# Patient Record
Sex: FEMALE | NOT HISPANIC OR LATINO | Employment: FULL TIME | URBAN - METROPOLITAN AREA
[De-identification: names, ages, dates, MRNs, and addresses within clinical notes are randomized per-mention and may not be internally consistent; named-entity substitution may affect disease eponyms.]

---

## 2018-01-17 ENCOUNTER — OFFICE VISIT (OUTPATIENT)
Dept: URGENT CARE | Facility: CLINIC | Age: 23
End: 2018-01-17
Payer: COMMERCIAL

## 2018-01-17 VITALS
HEART RATE: 88 BPM | OXYGEN SATURATION: 96 % | WEIGHT: 105.2 LBS | DIASTOLIC BLOOD PRESSURE: 70 MMHG | HEIGHT: 64 IN | RESPIRATION RATE: 12 BRPM | TEMPERATURE: 98.4 F | BODY MASS INDEX: 17.96 KG/M2 | SYSTOLIC BLOOD PRESSURE: 98 MMHG

## 2018-01-17 DIAGNOSIS — R05.9 COUGH: ICD-10-CM

## 2018-01-17 DIAGNOSIS — R68.89 FLU-LIKE SYMPTOMS: ICD-10-CM

## 2018-01-17 PROCEDURE — 99203 OFFICE O/P NEW LOW 30 MIN: CPT | Performed by: PHYSICIAN ASSISTANT

## 2018-01-17 RX ORDER — OSELTAMIVIR PHOSPHATE 75 MG/1
75 CAPSULE ORAL 2 TIMES DAILY
Qty: 10 CAP | Refills: 0 | Status: SHIPPED | OUTPATIENT
Start: 2018-01-17 | End: 2018-01-22

## 2018-01-17 RX ORDER — AZITHROMYCIN 250 MG/1
TABLET, FILM COATED ORAL
Qty: 6 TAB | Refills: 1 | Status: SHIPPED | OUTPATIENT
Start: 2018-01-17 | End: 2021-09-28

## 2018-01-17 ASSESSMENT — ENCOUNTER SYMPTOMS
BODY ACHES: 1
CARDIOVASCULAR NEGATIVE: 1
HEADACHES: 1
EYES NEGATIVE: 1
MYALGIAS: 1
FEVER: 0
COUGH: 0
NUMBER OF EPISODES OF EMESIS TODAY: 1
CHILLS: 1
RESPIRATORY NEGATIVE: 1
GASTROINTESTINAL NEGATIVE: 1
SORE THROAT: 0

## 2018-01-17 NOTE — PROGRESS NOTES
"Subjective:      Martha Joseph is a 22 y.o. female who presents with Headache (x5days); Emesis; Generalized Body Aches; and Chills            Emesis   Associated symptoms include chills, headaches and myalgias. Pertinent negatives include no congestion, coughing, fever or sore throat.   Generalized Body Aches   This is a new problem. The current episode started yesterday (aches, naus, ha). The problem occurs constantly. The problem has been unchanged. Associated symptoms include chills, headaches and myalgias. Pertinent negatives include no congestion, coughing, fever or sore throat. Nothing aggravates the symptoms. She has tried nothing for the symptoms. The treatment provided no relief.   Chills   Associated symptoms include chills, headaches and myalgias. Pertinent negatives include no congestion, coughing, fever or sore throat.       Review of Systems   Constitutional: Positive for chills. Negative for fever.   HENT: Negative.  Negative for congestion and sore throat.    Eyes: Negative.    Respiratory: Negative.  Negative for cough.    Cardiovascular: Negative.    Gastrointestinal: Negative.    Musculoskeletal: Positive for myalgias.   Skin: Negative.    Neurological: Positive for headaches.          Objective:     BP (!) 98/70   Pulse 88   Temp 36.9 °C (98.4 °F)   Resp 12   Ht 1.626 m (5' 4\")   Wt 47.7 kg (105 lb 3.2 oz)   SpO2 96%   BMI 18.06 kg/m²      Physical Exam   Constitutional: She is oriented to person, place, and time. She appears well-developed and well-nourished. No distress.   HENT:   Head: Normocephalic and atraumatic.   Eyes: EOM are normal. Pupils are equal, round, and reactive to light.   Neck: Normal range of motion. Neck supple.   Cardiovascular: Normal rate.    Pulmonary/Chest: Effort normal and breath sounds normal. No respiratory distress.   Neurological: She is alert and oriented to person, place, and time.   Skin: Skin is warm and dry.   Psychiatric: She has a normal mood and " "affect. Her behavior is normal.   Nursing note and vitals reviewed.    Vitals:    01/17/18 1219   BP: (!) 98/70   Pulse: 88   Resp: 12   Temp: 36.9 °C (98.4 °F)   SpO2: 96%   Weight: 47.7 kg (105 lb 3.2 oz)   Height: 1.626 m (5' 4\")     Active Ambulatory Problems     Diagnosis Date Noted   • Acute transverse myelitis (CMS-HCC) 11/16/2009     Resolved Ambulatory Problems     Diagnosis Date Noted   • No Resolved Ambulatory Problems     Past Medical History:   Diagnosis Date   • Transverse myelitis (CMS-HCC)      Current Outpatient Prescriptions on File Prior to Visit   Medication Sig Dispense Refill   • albuterol (VENTOLIN OR PROVENTIL) 108 (90 BASE) MCG/ACT AERS inhalation aerosol Inhale 2 Puffs by mouth every four hours as needed for Shortness of Breath. With a spacer device 1 Inhaler 0     No current facility-administered medications on file prior to visit.      Social History     Social History   • Marital status: Single     Spouse name: N/A   • Number of children: N/A   • Years of education: N/A     Occupational History   • Not on file.     Social History Main Topics   • Smoking status: Current Every Day Smoker   • Smokeless tobacco: Never Used   • Alcohol use No   • Drug use: No   • Sexual activity: No     Other Topics Concern   • Not on file     Social History Narrative   • No narrative on file     History reviewed. No pertinent family history.  Patient has no known allergies.              Assessment/Plan:     ·  flu sx      · rx meds; otc prn      "

## 2018-01-17 NOTE — LETTER
January 17, 2018         Patient: Martha Joseph   YOB: 1995   Date of Visit: 1/17/2018           To Whom it May Concern:    Martha Joseph was seen in my clinic on 1/17/2018 for illness; please excuse Tuesday thru Thursday.    If you have any questions or concerns, please don't hesitate to call.        Sincerely,           Cole Foley P.A.-C.  Electronically Signed

## 2018-01-19 ENCOUNTER — OFFICE VISIT (OUTPATIENT)
Dept: URGENT CARE | Facility: CLINIC | Age: 23
End: 2018-01-19
Payer: COMMERCIAL

## 2018-01-19 VITALS
BODY MASS INDEX: 17.93 KG/M2 | WEIGHT: 105 LBS | HEART RATE: 106 BPM | HEIGHT: 64 IN | TEMPERATURE: 98.2 F | OXYGEN SATURATION: 95 % | DIASTOLIC BLOOD PRESSURE: 74 MMHG | SYSTOLIC BLOOD PRESSURE: 100 MMHG

## 2018-01-19 DIAGNOSIS — J06.9 VIRAL URI WITH COUGH: ICD-10-CM

## 2018-01-19 PROCEDURE — 99212 OFFICE O/P EST SF 10 MIN: CPT | Performed by: NURSE PRACTITIONER

## 2018-01-19 ASSESSMENT — ENCOUNTER SYMPTOMS
COUGH: 1
FEVER: 1

## 2018-01-19 NOTE — LETTER
January 19, 2018         Patient: Martha Joseph   YOB: 1995   Date of Visit: 1/19/2018           To Whom it May Concern:    Martha Joseph was seen in my clinic on 1/19/2018. Please excuse her from work today.         Sincerely,           OLI Taylor.  Electronically Signed

## 2018-01-19 NOTE — PROGRESS NOTES
"Subjective:      Martha Joseph is a 22 y.o. female who presents with Cough (\"chest congestion,needs extended note for work\")            This is a new problem. Pt reports she was here a few days ago with a cold and given several Rx. She is taking them and feeling better but still had a fever this morning and could not go back to work today. She needs a note excusing her from work today.        Review of Systems   Constitutional: Positive for fever.   Respiratory: Positive for cough.    All other systems reviewed and are negative.    Past Medical History:   Diagnosis Date   • Transverse myelitis (CMS-HCC)     No past surgical history on file.   Social History     Social History   • Marital status: Single     Spouse name: N/A   • Number of children: N/A   • Years of education: N/A     Occupational History   • Not on file.     Social History Main Topics   • Smoking status: Current Every Day Smoker   • Smokeless tobacco: Never Used   • Alcohol use No   • Drug use: No   • Sexual activity: No     Other Topics Concern   • Not on file     Social History Narrative   • No narrative on file          Objective:     /74   Pulse (!) 106   Temp 36.8 °C (98.2 °F)   Ht 1.626 m (5' 4\")   Wt 47.6 kg (105 lb)   SpO2 95%   BMI 18.02 kg/m²      Physical Exam   Constitutional: She is oriented to person, place, and time. Vital signs are normal. She appears well-developed and well-nourished.   HENT:   Head: Normocephalic and atraumatic.   Nose: Nose normal.   Eyes: EOM are normal. Pupils are equal, round, and reactive to light.   Neck: Normal range of motion.   Cardiovascular: Normal rate and regular rhythm.    Pulmonary/Chest: Effort normal and breath sounds normal.   Musculoskeletal: Normal range of motion.   Neurological: She is alert and oriented to person, place, and time.   Skin: Skin is warm and dry. Capillary refill takes less than 2 seconds.   Psychiatric: She has a normal mood and affect. Her speech is normal and " behavior is normal. Thought content normal.   Vitals reviewed.              Assessment/Plan:     1. Viral URI with cough    Pt denies any other needs at this time  Work note provided  Instructed to return to  or nearest emergency department if symptoms fail to improve, for any change in condition, further concerns, or new concerning symptoms.  Patient states understanding of the plan of care and discharge instructions.

## 2018-07-09 ENCOUNTER — OFFICE VISIT (OUTPATIENT)
Dept: URGENT CARE | Facility: CLINIC | Age: 23
End: 2018-07-09
Payer: COMMERCIAL

## 2018-07-09 VITALS
DIASTOLIC BLOOD PRESSURE: 70 MMHG | TEMPERATURE: 99.2 F | SYSTOLIC BLOOD PRESSURE: 118 MMHG | BODY MASS INDEX: 18.51 KG/M2 | WEIGHT: 108.4 LBS | RESPIRATION RATE: 18 BRPM | OXYGEN SATURATION: 98 % | HEIGHT: 64 IN | HEART RATE: 97 BPM

## 2018-07-09 DIAGNOSIS — H81.13 BENIGN PAROXYSMAL POSITIONAL VERTIGO DUE TO BILATERAL VESTIBULAR DISORDER: ICD-10-CM

## 2018-07-09 PROCEDURE — 99214 OFFICE O/P EST MOD 30 MIN: CPT | Performed by: FAMILY MEDICINE

## 2018-07-09 RX ORDER — MECLIZINE HYDROCHLORIDE 25 MG/1
25 TABLET ORAL 3 TIMES DAILY PRN
Qty: 30 TAB | Refills: 0 | Status: SHIPPED | OUTPATIENT
Start: 2018-07-09 | End: 2021-09-28

## 2018-07-09 NOTE — PROGRESS NOTES
"  Chief Complaint   Patient presents with   • Dizziness     x3days, dizziness, ear pain, fatigue         Patient complains of dizziness for 3 days.   Symptoms are unchanged since onset.   Symptoms are constant.  Denies almost passing out.   States she feels room is spinning around, feels unsteady on feet.   Worse with bending head to look up, getting up from supine position.    No double vision, hearing loss, numbness, nausea, vomiting, headache, slurred speech or shortness of breath.    Feels unsteady when walking.  Denies depression, anxiety.   + rt ear pain         Social History   Substance Use Topics   • Smoking status: Current Every Day Smoker   • Smokeless tobacco: Never Used   • Alcohol use No         Past Medical History:   Diagnosis Date   • Transverse myelitis (HCC)            Review of Systems:  Review of Systems   Constitutional: Negative for fever, chills.   HENT: no neck pain, headache  Eyes: denies vision changes or discharge  Respiratory: no cough, congestion, SOB  Cardiovascular: denies palpations, chest pain   Gastrointestinal: denies diarrhea, abdominal pain or constipation.  No blood in stool.  Musculoskeletal: denies back pain or joint pain    Skin: no itching or rash  Neurological: No numbness or tingling.   Psych - denies depression, anxiety   - Denies dysuria, frequency or discharge  10 point ROS otherwise negative, except per HPI      Blood pressure 118/70, pulse 97, temperature 37.3 °C (99.2 °F), resp. rate 18, height 1.626 m (5' 4\"), weight 49.2 kg (108 lb 6.4 oz), SpO2 98 %.      EXAM   HEENT - PERRLA, EOMI  TMs both normal.   No nasal congestion  Neuro - alert and oriented x3. CN 2-12 grossly intact.  Lungs - CTA. No wheezes, rhonchi or rales.  Heart - regular rate and rhythm without murmur.  Abdomen - soft and non-tender, bowel sounds active x4.  Musculoskeletal - No lower extremity edema noted.  Jennifer's sign negative bilaterally      assessment & plan    1. BPPV (benign paroxysmal " positional vertigo), unspecified laterality     - meclizine (ANTIVERT) 25 MG Tab; Take 1 Tab by mouth 3 times a day as needed.  Dispense: 30 Tab; Refill: 0    Follow up in one week if no improvement, sooner if symptoms worsen.

## 2018-07-27 ENCOUNTER — OFFICE VISIT (OUTPATIENT)
Dept: URGENT CARE | Facility: PHYSICIAN GROUP | Age: 23
End: 2018-07-27
Payer: COMMERCIAL

## 2018-07-27 VITALS
DIASTOLIC BLOOD PRESSURE: 68 MMHG | WEIGHT: 108 LBS | OXYGEN SATURATION: 96 % | HEART RATE: 71 BPM | HEIGHT: 64 IN | SYSTOLIC BLOOD PRESSURE: 124 MMHG | TEMPERATURE: 98.6 F | BODY MASS INDEX: 18.44 KG/M2

## 2018-07-27 DIAGNOSIS — H69.93 DYSFUNCTION OF BOTH EUSTACHIAN TUBES: ICD-10-CM

## 2018-07-27 PROCEDURE — 99214 OFFICE O/P EST MOD 30 MIN: CPT | Performed by: FAMILY MEDICINE

## 2018-07-27 RX ORDER — FLUTICASONE PROPIONATE 50 MCG
1 SPRAY, SUSPENSION (ML) NASAL 2 TIMES DAILY
Qty: 1 BOTTLE | Refills: 0 | Status: SHIPPED | OUTPATIENT
Start: 2018-07-27 | End: 2021-09-28

## 2018-07-27 RX ORDER — FEXOFENADINE HCL AND PSEUDOEPHEDRINE HCI 60; 120 MG/1; MG/1
1 TABLET, EXTENDED RELEASE ORAL 2 TIMES DAILY
Qty: 30 TAB | Refills: 0 | Status: SHIPPED | OUTPATIENT
Start: 2018-07-27 | End: 2021-09-28

## 2018-07-27 NOTE — PROGRESS NOTES
Subjective:      CC: Otalgia             bilateral ear congestion  This is a new problem. The current episode started 1 day ago. The problem occurs intermittently. The problem has been unchanged. Associated symptoms include : intermittent dizziness. Pertinent negatives include no abdominal pain, chest pain, chills, fever, headaches, joint swelling, myalgias, nausea, neck pain, rash or visual change. Nothing aggravates the symptoms. Pt has tried nothing for the symptoms.     Social History   Substance Use Topics   • Smoking status: Current Every Day Smoker   • Smokeless tobacco: Never Used   • Alcohol use No         Past Medical History:   Diagnosis Date   • Transverse myelitis (HCC)        Current Outpatient Prescriptions on File Prior to Visit   Medication Sig Dispense Refill   • meclizine (ANTIVERT) 25 MG Tab Take 1 Tab by mouth 3 times a day as needed. (Patient not taking: Reported on 7/27/2018) 30 Tab 0   • IBUPROFEN PO Take  by mouth.     • azithromycin (ZITHROMAX) 250 MG Tab z-lyle; U.D. (Patient not taking: Reported on 7/27/2018) 6 Tab 1   • albuterol (VENTOLIN OR PROVENTIL) 108 (90 BASE) MCG/ACT AERS inhalation aerosol Inhale 2 Puffs by mouth every four hours as needed for Shortness of Breath. With a spacer device (Patient not taking: Reported on 7/27/2018) 1 Inhaler 0     No current facility-administered medications on file prior to visit.          Review of Systems   Constitutional: Negative for fever and chills.   HENT:  . Negative for hearing loss and tinnitus.    Respiratory: no cough.  Negative for hemoptysis, shortness of breath and wheezing.    Cardiovascular: Negative for chest pain, palpitations and leg swelling.   Gastrointestinal: Negative for nausea and abdominal pain.   Musculoskeletal: Negative for myalgias, joint swelling and neck pain.   Skin: Negative for rash.   Neurological: Negative for headaches.   All other systems reviewed and are negative.         Objective:     Blood pressure 124/68,  "pulse 71, temperature 37 °C (98.6 °F), height 1.626 m (5' 4\"), weight 49 kg (108 lb), SpO2 96 %.    Physical Exam   Constitutional: Vital signs are normal. She is active. No distress.   HENT:   Head: There is normal jaw occlusion.   Right Ear: External ear normal. Tympanic membrane is normal. No middle ear effusion.   Left Ear: External ear normal. Tympanic membrane is normal. No middle ear effusion.    Nose: no edema.  No nasal discharge.   Mouth/Throat: Mucous membranes are moist. No oral lesions.  No erythema. No oropharyngeal exudate, pharynx swelling or pharynx petechiae. Tonsils are 0 on the right. Tonsils are 0 on the left. No tonsillar exudate.   Eyes: Conjunctivae and EOM are normal. Pupils are equal, round, and reactive to light. Right eye exhibits no discharge. Left eye exhibits no discharge.   Neck: Normal range of motion. Neck supple.  No adenopathy  Cardiovascular: Normal rate and regular rhythm.  Pulses are palpable.    No murmur heard.  Pulmonary/Chest: Effort normal and breath sounds normal. There is normal air entry. No respiratory distress. no wheezes, rhonchi,  retraction.   Musculoskeletal:   no edema.   Neurological: A/O x 3.   CN 2-12 intact   Skin: Skin is warm. Capillary refill takes less than 3 seconds. No purpura and no rash noted. Patient is not diaphoretic. No jaundice or pallor.   Nursing note and vitals reviewed.              Assessment/Plan:     1. Dysfunction of both eustachian tubes     - fexofenadine-pseudoephedrine (ALLEGRA-D)  MG per tablet; Take 1 Tab by mouth 2 times a day.  Dispense: 30 Tab; Refill: 0  - fluticasone (FLONASE) 50 MCG/ACT nasal spray; Spray 1 Spray in nose 2 times a day.  Dispense: 1 Bottle; Refill: 0        Follow up in one week if no improvement    "

## 2018-08-14 ENCOUNTER — OFFICE VISIT (OUTPATIENT)
Dept: URGENT CARE | Facility: CLINIC | Age: 23
End: 2018-08-14
Payer: COMMERCIAL

## 2018-08-14 VITALS
HEART RATE: 83 BPM | SYSTOLIC BLOOD PRESSURE: 90 MMHG | OXYGEN SATURATION: 95 % | BODY MASS INDEX: 18.85 KG/M2 | TEMPERATURE: 98.7 F | RESPIRATION RATE: 14 BRPM | DIASTOLIC BLOOD PRESSURE: 60 MMHG | WEIGHT: 110.4 LBS | HEIGHT: 64 IN

## 2018-08-14 DIAGNOSIS — J02.9 SORE THROAT: ICD-10-CM

## 2018-08-14 DIAGNOSIS — J20.9 ACUTE BRONCHITIS, UNSPECIFIED ORGANISM: ICD-10-CM

## 2018-08-14 LAB
INT CON NEG: NORMAL
INT CON POS: NORMAL
S PYO AG THROAT QL: NEGATIVE

## 2018-08-14 PROCEDURE — 87880 STREP A ASSAY W/OPTIC: CPT | Performed by: NURSE PRACTITIONER

## 2018-08-14 PROCEDURE — 99214 OFFICE O/P EST MOD 30 MIN: CPT | Performed by: NURSE PRACTITIONER

## 2018-08-14 RX ORDER — AZITHROMYCIN 250 MG/1
TABLET, FILM COATED ORAL
Qty: 6 TAB | Refills: 0 | Status: SHIPPED | OUTPATIENT
Start: 2018-08-14 | End: 2021-09-28

## 2018-08-14 RX ORDER — METHYLPREDNISOLONE 4 MG/1
TABLET ORAL
Qty: 1 KIT | Refills: 0 | Status: SHIPPED | OUTPATIENT
Start: 2018-08-14 | End: 2019-03-18

## 2018-08-14 ASSESSMENT — ENCOUNTER SYMPTOMS
NAUSEA: 0
CHILLS: 0
MYALGIAS: 0
SHORTNESS OF BREATH: 0
SORE THROAT: 1
WHEEZING: 1
COUGH: 1
DIZZINESS: 0
EYE DISCHARGE: 0
HEADACHES: 0
DIARRHEA: 0
FEVER: 0
EYE REDNESS: 0
SPUTUM PRODUCTION: 0

## 2018-08-14 NOTE — LETTER
August 14, 2018        Martha Joseph  [address]  [place] [address]        Martha was seen in our clinic today and she is excused from work for today and tomorrow. Thank you.    If you have any questions or concerns, please don't hesitate to call.        Sincerely,        MACEY Cuenca.P.MOOSE.    Electronically Signed

## 2018-08-14 NOTE — PROGRESS NOTES
Subjective:      Martha Joseph is a 23 y.o. female who presents with Pharyngitis (x3 days) and Cough (chest congestion)            HPI New problem. 23 year old with sore throat for 3 days and chest congestion and nasal congestion. Denies fever, chills, myalgia, nausea or diarrhea. She is not taking any medication for this illness. No history of asthma, non smoker.  Patient has no known allergies.  Current Outpatient Prescriptions on File Prior to Visit   Medication Sig Dispense Refill   • ACYCLOVIR EX by Apply externally route.     • fexofenadine-pseudoephedrine (ALLEGRA-D)  MG per tablet Take 1 Tab by mouth 2 times a day. 30 Tab 0   • fluticasone (FLONASE) 50 MCG/ACT nasal spray Spray 1 Spray in nose 2 times a day. 1 Bottle 0   • meclizine (ANTIVERT) 25 MG Tab Take 1 Tab by mouth 3 times a day as needed. (Patient not taking: Reported on 7/27/2018) 30 Tab 0   • IBUPROFEN PO Take  by mouth.     • azithromycin (ZITHROMAX) 250 MG Tab z-lyle; U.D. (Patient not taking: Reported on 7/27/2018) 6 Tab 1   • albuterol (VENTOLIN OR PROVENTIL) 108 (90 BASE) MCG/ACT AERS inhalation aerosol Inhale 2 Puffs by mouth every four hours as needed for Shortness of Breath. With a spacer device (Patient not taking: Reported on 7/27/2018) 1 Inhaler 0     No current facility-administered medications on file prior to visit.      Social History     Social History   • Marital status: Single     Spouse name: N/A   • Number of children: N/A   • Years of education: N/A     Occupational History   • Not on file.     Social History Main Topics   • Smoking status: Current Every Day Smoker   • Smokeless tobacco: Never Used   • Alcohol use No   • Drug use: No   • Sexual activity: No     Other Topics Concern   • Not on file     Social History Narrative   • No narrative on file     family history is not on file.      Review of Systems   Constitutional: Negative for chills, fever and malaise/fatigue.   HENT: Positive for congestion and sore throat.  "   Eyes: Negative for discharge and redness.   Respiratory: Positive for cough and wheezing. Negative for sputum production and shortness of breath.    Gastrointestinal: Negative for diarrhea and nausea.   Musculoskeletal: Negative for myalgias.   Skin: Negative for rash.   Neurological: Negative for dizziness and headaches.          Objective:     BP (!) 90/60   Pulse 83   Temp 37.1 °C (98.7 °F)   Resp 14   Ht 1.626 m (5' 4\")   Wt 50.1 kg (110 lb 6.4 oz)   SpO2 95%   BMI 18.95 kg/m²      Physical Exam   Constitutional: She is oriented to person, place, and time. She appears well-developed and well-nourished. No distress.   HENT:   Head: Normocephalic and atraumatic.   Right Ear: External ear and ear canal normal. Tympanic membrane is not injected and not perforated. No middle ear effusion.   Left Ear: External ear and ear canal normal. Tympanic membrane is not injected and not perforated.  No middle ear effusion.   Nose: Mucosal edema present.   Mouth/Throat: Posterior oropharyngeal erythema present. No oropharyngeal exudate.   Eyes: Conjunctivae are normal. Right eye exhibits no discharge. Left eye exhibits no discharge.   Neck: Normal range of motion. Neck supple.   Cardiovascular: Normal rate, regular rhythm and normal heart sounds.    No murmur heard.  Pulmonary/Chest: Effort normal. No respiratory distress. She has wheezes.   Wheezes right lower lobe.   Musculoskeletal: Normal range of motion.   Normal movement of all 4 extremities.   Lymphadenopathy:     She has no cervical adenopathy.        Right: No supraclavicular adenopathy present.        Left: No supraclavicular adenopathy present.   Neurological: She is alert and oriented to person, place, and time. Gait normal.   Skin: Skin is warm and dry.   Psychiatric: She has a normal mood and affect. Her behavior is normal. Thought content normal.   Nursing note and vitals reviewed.              Assessment/Plan:     1. Acute bronchitis, unspecified " organism  azithromycin (ZITHROMAX) 250 MG Tab    MethylPREDNISolone (MEDROL DOSEPAK) 4 MG Tablet Therapy Pack   2. Sore throat  POCT Rapid Strep A     Strep negative.  z-pack and medrol  Differential diagnosis, natural history, supportive care, and indications for immediate follow-up discussed at length.

## 2018-09-12 ENCOUNTER — OFFICE VISIT (OUTPATIENT)
Dept: URGENT CARE | Facility: PHYSICIAN GROUP | Age: 23
End: 2018-09-12
Payer: COMMERCIAL

## 2018-09-12 VITALS
HEART RATE: 67 BPM | TEMPERATURE: 97.3 F | BODY MASS INDEX: 19.29 KG/M2 | WEIGHT: 113 LBS | SYSTOLIC BLOOD PRESSURE: 104 MMHG | HEIGHT: 64 IN | DIASTOLIC BLOOD PRESSURE: 62 MMHG | RESPIRATION RATE: 12 BRPM | OXYGEN SATURATION: 97 %

## 2018-09-12 DIAGNOSIS — J06.9 UPPER RESPIRATORY TRACT INFECTION, UNSPECIFIED TYPE: ICD-10-CM

## 2018-09-12 PROCEDURE — 99213 OFFICE O/P EST LOW 20 MIN: CPT | Performed by: PHYSICIAN ASSISTANT

## 2018-09-12 NOTE — PROGRESS NOTES
"CC:Patient is j___[id number]___[id number]___ who comes in with a _2___ day history of cough, nasal congestion, sore throat and post nasal drip. Denies fever, chills, nausea and vomiting. Denies any hx of asthma or other respiratory disorder. IS not a smoker.  Denies shortness of breath, chest tightness or chest pain      PMH: non pertinent to this examination  PSH: non pertinent to this examination  FH: not pertinent to this examination    Medications: OTC cold medication    Allergies:Patient has no known allergies.      ROS: denies chest tightness, sob, fever, chills, nausea or vomiting, no change in urinary habbits    Blood pressure 104/62, pulse 67, temperature 36.3 °C (97.3 °F), resp. rate 12, height 1.626 m (5' 4\"), weight 51.3 kg (113 lb), last menstrual period 08/22/2018, SpO2 97 %.    Physical Exam:  Vitals: are unremarkable. Patient is afebrile and O2sats are within normal range.  Gen: A and O ×3 no acute distress, well-nourished well-hydrated, nontoxic  HEENT: TMs and oropharynx are clear. nares are patent and clear. No pain to percussion in the maxillary or frontal sinus region  Neck soft supple without cervical lymphadenopathy  Cardiovascular: Regular rate and rhythm normal S1 and S2. No murmur rubs or gallops  Lungs are clear to auscultation bilaterally, no wheezes rales or rhonchi. Good inspiratory and expiratory breath sounds  Abdomen: Soft nontender nondistended with good bowel  Skin is well perfused without evidence of rash or lesions  Neurologically: No deficit noted    Assessment plan:    1. Viral upper respiratory infection: Discussed viral etiology at length. Discussed supportive care measures. Increase fluids, take meds as directed and follow-up if symptoms persist or worsen despite treatment.    Discussed viral etiology at length. Supportive care measures encouraged  "

## 2018-11-05 ENCOUNTER — OFFICE VISIT (OUTPATIENT)
Dept: URGENT CARE | Facility: PHYSICIAN GROUP | Age: 23
End: 2018-11-05
Payer: COMMERCIAL

## 2018-11-05 VITALS
BODY MASS INDEX: 18.78 KG/M2 | DIASTOLIC BLOOD PRESSURE: 70 MMHG | SYSTOLIC BLOOD PRESSURE: 112 MMHG | HEIGHT: 64 IN | HEART RATE: 102 BPM | RESPIRATION RATE: 16 BRPM | OXYGEN SATURATION: 98 % | WEIGHT: 110 LBS | TEMPERATURE: 97.9 F

## 2018-11-05 DIAGNOSIS — H81.13 BENIGN PAROXYSMAL POSITIONAL VERTIGO DUE TO BILATERAL VESTIBULAR DISORDER: ICD-10-CM

## 2018-11-05 PROCEDURE — 99214 OFFICE O/P EST MOD 30 MIN: CPT | Performed by: FAMILY MEDICINE

## 2018-11-05 RX ORDER — MECLIZINE HYDROCHLORIDE 25 MG/1
25 TABLET ORAL 3 TIMES DAILY PRN
Qty: 30 TAB | Refills: 0 | Status: SHIPPED | OUTPATIENT
Start: 2018-11-05 | End: 2021-09-28

## 2018-11-05 NOTE — LETTER
November 5, 2018         Patient: Martha Joseph   YOB: 1995   Date of Visit: 11/5/2018           To Whom it May Concern:    Martha Joseph was seen in my clinic on 11/5/2018. She may return to work on Wednesday..    If you have any questions or concerns, please don't hesitate to call.        Sincerely,           Prosper Perez M.D.  Electronically Signed

## 2018-11-05 NOTE — PROGRESS NOTES
"Chief Complaint   Patient presents with   • Dizziness     headaches, [id number]                 Patient complains of dizziness for 3 days.   Symptoms are unchanged since onset.   Symptoms are intermittent and describes feeling of dysequilibrium, unsteady on feet.   Worse with bending head to look up, getting up from supine position.    No double vision, hearing loss, numbness, nausea, vomiting, headache, slurred speech or shortness of breath.   Denies depression, anxiety.    Past Medical History:   Diagnosis Date   • Transverse myelitis (HCC)        Social History   Substance Use Topics   • Smoking status: Current Every Day Smoker     Packs/day: 0.25     Types: Cigarettes   • Smokeless tobacco: Never Used   • Alcohol use No            Review of Systems   Constitutional: Negative for fever.   Respiratory: Negative for shortness of breath.    Cardiovascular: Negative for chest pain.   GI - no diarrhea  Neurological: Positive for dizziness. Negative for headaches.   All other systems reviewed and are negative.         Objective:     Blood pressure 112/70, pulse (!) 102, temperature 36.6 °C (97.9 °F), temperature source Temporal, resp. rate 16, height 1.626 m (5' 4\"), weight 49.9 kg (110 lb), SpO2 98 %.      Physical Exam   Constitutional: She is oriented to person, place, and time. She appears well-developed and well-nourished. No distress.   HENT:   Head: Normocephalic and atraumatic. EOMI.  PERRLA.  No nystagmus  Mouth/Throat: No oropharyngeal exudate.   TMs normal   Eyes: Conjunctivae are normal.   Cardiovascular: Normal rate, regular rhythm and normal heart sounds.    Pulmonary/Chest: Effort normal and breath sounds normal. No respiratory distress. Pt has no wheezes, rales.   Neurological: pt is alert and oriented to person, place, and time. No cranial nerve deficit. Coordination and gait normal.   Skin: Skin is warm. She is not diaphoretic. No erythema.   Psychiatric:  behavior is normal.   Nursing note and " vitals reviewed.         Lab Results   Component Value Date/Time    POCCOLOR yellow 02/01/2011 09:40 AM    POCAPPEAR clear 02/01/2011 09:40 AM    POCLEUKEST small 02/01/2011 09:40 AM    POCNITRITE pos 02/01/2011 09:40 AM    POCUROBILIGE neg 02/01/2011 09:40 AM    POCPROTEIN neg 02/01/2011 09:40 AM    POCURPH 5.0 02/01/2011 09:40 AM    POCBLOOD trace 02/01/2011 09:40 AM    POCSPGRV 1.020 02/01/2011 09:40 AM    POCKETONES neg 02/01/2011 09:40 AM    POCBILIRUBIN neg 02/01/2011 09:40 AM    POCGLUCUA neg 02/01/2011 09:40 AM           Assessment/Plan:     1. BPPV (benign paroxysmal positional vertigo), unspecified laterality   UA unremarkable.     - meclizine (ANTIVERT) 25 MG Tab; Take 1 Tab by mouth 3 times a day as needed.  Dispense: 30 Tab; Refill: 0    Follow up in one week if no improvement, sooner if symptoms worsen.            yes

## 2019-03-18 ENCOUNTER — OFFICE VISIT (OUTPATIENT)
Dept: URGENT CARE | Facility: CLINIC | Age: 24
End: 2019-03-18
Payer: COMMERCIAL

## 2019-03-18 VITALS
HEIGHT: 64 IN | DIASTOLIC BLOOD PRESSURE: 60 MMHG | OXYGEN SATURATION: 97 % | BODY MASS INDEX: 19.12 KG/M2 | TEMPERATURE: 97.4 F | RESPIRATION RATE: 16 BRPM | HEART RATE: 78 BPM | SYSTOLIC BLOOD PRESSURE: 102 MMHG | WEIGHT: 112 LBS

## 2019-03-18 DIAGNOSIS — M25.471 RIGHT ANKLE SWELLING: ICD-10-CM
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
## 2019-03-18 PROCEDURE — 99214 OFFICE O/P EST MOD 30 MIN: CPT | Performed by: PHYSICIAN ASSISTANT

## 2019-03-18 RX ORDER — METHYLPREDNISOLONE 4 MG/1
TABLET ORAL
Qty: 21 TAB | Refills: 0 | Status: SHIPPED | OUTPATIENT
Start: 2019-03-18 | End: 2021-09-28

## 2019-03-18 ASSESSMENT — ENCOUNTER SYMPTOMS
CONSTITUTIONAL NEGATIVE: 1
FATIGUE: 0
NUMBNESS: 0
WEAKNESS: 0
GASTROINTESTINAL NEGATIVE: 1
JOINT SWELLING: 1
HEADACHES: 0
CHANGE IN BOWEL HABIT: 0
EDEMA: 1
FEVER: 0
FALLS: 0
CARDIOVASCULAR NEGATIVE: 1
NEUROLOGICAL NEGATIVE: 1
RESPIRATORY NEGATIVE: 1
COUGH: 0

## 2019-03-18 NOTE — PROGRESS NOTES
Subjective:      Martha Joseph is a 23 y.o. female who presents with Ankle Swelling (x 3 days,  Rt. ankle swelling after mosquito bite)            Patient had 2 mosquito bites on her right ankle 3 days ago.  Since then she has had surrounding swelling and itching.  Right ankle joint is swollen however she has no injuries or falls.  She has full range of motion.  Denies any fever chills, open lesions.  She states she feels well.      Edema   This is a new problem. The current episode started in the past 7 days. The problem occurs constantly. The problem has been gradually worsening. Associated symptoms include joint swelling (Right ankle) and a rash. Pertinent negatives include no change in bowel habit, chest pain, congestion, coughing, fatigue, fever, headaches, numbness or weakness. Nothing aggravates the symptoms. She has tried nothing for the symptoms. The treatment provided no relief.       PMH:  has a past medical history of Transverse myelitis (HCC).  MEDS:   Current Outpatient Prescriptions:   •  MethylPREDNISolone (MEDROL DOSEPAK) 4 MG Tablet Therapy Pack, Medrol Dosepack, Use as directed, Disp: 21 Tab, Rfl: 0  •  norgestrel-ethinyl estradiol (LO-OVRAL) 0.3-30 MG-MCG Tab, Take 1 Tab by mouth every day., Disp: , Rfl:   •  meclizine (ANTIVERT) 25 MG Tab, Take 1 Tab by mouth 3 times a day as needed. (Patient not taking: Reported on 3/18/2019), Disp: 30 Tab, Rfl: 0  •  azithromycin (ZITHROMAX) 250 MG Tab, Take as directed (Patient not taking: Reported on 11/5/2018), Disp: 6 Tab, Rfl: 0  •  ACYCLOVIR EX, by Apply externally route., Disp: , Rfl:   •  fexofenadine-pseudoephedrine (ALLEGRA-D)  MG per tablet, Take 1 Tab by mouth 2 times a day. (Patient not taking: Reported on 11/5/2018), Disp: 30 Tab, Rfl: 0  •  fluticasone (FLONASE) 50 MCG/ACT nasal spray, Spray 1 Spray in nose 2 times a day. (Patient not taking: Reported on 11/5/2018), Disp: 1 Bottle, Rfl: 0  •  meclizine (ANTIVERT) 25 MG Tab, Take 1 Tab by  "mouth 3 times a day as needed. (Patient not taking: Reported on 7/27/2018), Disp: 30 Tab, Rfl: 0  •  IBUPROFEN PO, Take  by mouth., Disp: , Rfl:   •  azithromycin (ZITHROMAX) 250 MG Tab, z-lyle; U.D. (Patient not taking: Reported on 7/27/2018), Disp: 6 Tab, Rfl: 1  •  albuterol (VENTOLIN OR PROVENTIL) 108 (90 BASE) MCG/ACT AERS inhalation aerosol, Inhale 2 Puffs by mouth every four hours as needed for Shortness of Breath. With a spacer device (Patient not taking: Reported on 11/5/2018), Disp: 1 Inhaler, Rfl: 0  ALLERGIES: No Known Allergies  SURGHX: No past surgical history on file.  SOCHX:  reports that she has been smoking Cigarettes.  She has been smoking about 0.25 packs per day. She has never used smokeless tobacco. She reports that she does not drink alcohol or use drugs.  FH: family history is not on file.    Review of Systems   Constitutional: Negative.  Negative for fatigue and fever.   HENT: Negative for congestion.    Respiratory: Negative.  Negative for cough.    Cardiovascular: Negative.  Negative for chest pain.   Gastrointestinal: Negative.  Negative for change in bowel habit.   Musculoskeletal: Positive for joint swelling (Right ankle). Negative for falls and joint pain.   Skin: Positive for itching and rash.   Neurological: Negative.  Negative for weakness, numbness and headaches.       Medications, Allergies, and current problem list reviewed today in Epic     Objective:     /60 (BP Location: Left arm, Patient Position: Sitting, BP Cuff Size: Adult)   Pulse 78   Temp 36.3 °C (97.4 °F) (Temporal)   Resp 16   Ht 1.626 m (5' 4\")   Wt 50.8 kg (112 lb)   SpO2 97%   BMI 19.22 kg/m²      Physical Exam   Constitutional: She is oriented to person, place, and time. She appears well-developed and well-nourished. No distress.   HENT:   Head: Normocephalic and atraumatic.   Eyes: Conjunctivae are normal.   Neck: Normal range of motion. Neck supple.   Cardiovascular: Normal rate, regular rhythm and " normal heart sounds.    Pulmonary/Chest: Effort normal and breath sounds normal. No respiratory distress. She has no wheezes. She has no rales.   Musculoskeletal:   To mosquito bites on right ankle.  One medial one lateral.  Surrounding erythema and pruritus.  There is soft tissue swelling distally.  She has full active and passive range of motion of her right ankle.  She denies any joint pain.  No open lesions or discharge.  No signs of infection or injury.   Neurological: She is alert and oriented to person, place, and time.   Skin: Skin is warm and dry. She is not diaphoretic.   Psychiatric: She has a normal mood and affect. Her behavior is normal. Judgment and thought content normal.   Nursing note and vitals reviewed.              Assessment/Plan:     1. Right ankle swelling  MethylPREDNISolone (MEDROL DOSEPAK) 4 MG Tablet Therapy Pack   2. Mosquito bite, initial encounter  MethylPREDNISolone (MEDROL DOSEPAK) 4 MG Tablet Therapy Pack     Appears to be an acute allergic reaction to the mosquito bite.  Her vital signs are normal, there are no open lesions or discharge.  No red streaking noted.  No signs of infection.  No injury or falls that would indicate musculoskeletal injury.  She does have redness, pruritus, soft tissue swelling.  She will be treated with Medrol Dosepak and Benadryl.  If no improvement or worsening symptoms return to clinic immediately.    OTC meds and conservative measures as discussed    Return to clinic or go to ED if symptoms worsen or persist. Indications for ED discussed at length. Patient voices understanding. Follow-up with your primary care provider in 3-5 days. Red flags discussed. All side effects of medication discussed including allergic response, GI upset, tendon injury, etc.    Please note that this dictation was created using voice recognition software. I have made every reasonable attempt to correct obvious errors, but I expect that there are errors of grammar and possibly  content that I did not discover before finalizing the note.

## 2019-07-26 ENCOUNTER — HOSPITAL ENCOUNTER (OUTPATIENT)
Dept: RADIOLOGY | Facility: MEDICAL CENTER | Age: 24
End: 2019-07-26
Attending: NURSE PRACTITIONER
Payer: COMMERCIAL

## 2019-07-26 ENCOUNTER — OFFICE VISIT (OUTPATIENT)
Dept: URGENT CARE | Facility: CLINIC | Age: 24
End: 2019-07-26
Payer: COMMERCIAL

## 2019-07-26 VITALS
HEART RATE: 85 BPM | WEIGHT: 108.2 LBS | RESPIRATION RATE: 16 BRPM | DIASTOLIC BLOOD PRESSURE: 82 MMHG | TEMPERATURE: 98.6 F | SYSTOLIC BLOOD PRESSURE: 126 MMHG | OXYGEN SATURATION: 99 % | HEIGHT: 64 IN | BODY MASS INDEX: 18.47 KG/M2

## 2019-07-26 DIAGNOSIS — R10.31 ACUTE RIGHT LOWER QUADRANT PAIN: ICD-10-CM

## 2019-07-26 DIAGNOSIS — R10.9 FLANK PAIN: ICD-10-CM

## 2019-07-26 LAB
APPEARANCE UR: CLEAR
BILIRUB UR STRIP-MCNC: NEGATIVE MG/DL
COLOR UR AUTO: YELLOW
GLUCOSE UR STRIP.AUTO-MCNC: NEGATIVE MG/DL
INT CON NEG: NEGATIVE
INT CON POS: POSITIVE
KETONES UR STRIP.AUTO-MCNC: NEGATIVE MG/DL
LEUKOCYTE ESTERASE UR QL STRIP.AUTO: NEGATIVE
NITRITE UR QL STRIP.AUTO: NEGATIVE
PH UR STRIP.AUTO: 7 [PH] (ref 5–8)
POC URINE PREGNANCY TEST: NEGATIVE
PROT UR QL STRIP: NEGATIVE MG/DL
RBC UR QL AUTO: NORMAL
SP GR UR STRIP.AUTO: 1.02
UROBILINOGEN UR STRIP-MCNC: 0.2 MG/DL

## 2019-07-26 PROCEDURE — 76705 ECHO EXAM OF ABDOMEN: CPT

## 2019-07-26 PROCEDURE — 81025 URINE PREGNANCY TEST: CPT | Performed by: NURSE PRACTITIONER

## 2019-07-26 PROCEDURE — 99213 OFFICE O/P EST LOW 20 MIN: CPT | Performed by: NURSE PRACTITIONER

## 2019-07-26 PROCEDURE — 76856 US EXAM PELVIC COMPLETE: CPT

## 2019-07-26 PROCEDURE — 81002 URINALYSIS NONAUTO W/O SCOPE: CPT | Performed by: NURSE PRACTITIONER

## 2019-07-26 ASSESSMENT — ENCOUNTER SYMPTOMS
NAUSEA: 0
DIZZINESS: 0
HEADACHES: 0
FEVER: 0
FLANK PAIN: 1
CHILLS: 0
ABDOMINAL PAIN: 1
DIARRHEA: 0
MYALGIAS: 0

## 2019-07-26 NOTE — PROGRESS NOTES
Subjective:      Martha Joseph is a 24 y.o. female who presents with Side Pain (pain on RT side and lower/mid back)            HPI New. 24 year old female with right lower quadrant pain as well as right flank pain for 2 weeks, worse this week. She denies fever, chills, myalgia, nausea or diarrhea. She has no burning or frequency of urination. She states LMP last week. She has only done copious amounts of cranberry juice for this.  Patient has no known allergies.  Current Outpatient Prescriptions on File Prior to Visit   Medication Sig Dispense Refill   • ACYCLOVIR EX by Apply externally route.     • MethylPREDNISolone (MEDROL DOSEPAK) 4 MG Tablet Therapy Pack Medrol Dosepack, Use as directed (Patient not taking: Reported on 7/26/2019) 21 Tab 0   • norgestrel-ethinyl estradiol (LO-OVRAL) 0.3-30 MG-MCG Tab Take 1 Tab by mouth every day.     • meclizine (ANTIVERT) 25 MG Tab Take 1 Tab by mouth 3 times a day as needed. (Patient not taking: Reported on 3/18/2019) 30 Tab 0   • azithromycin (ZITHROMAX) 250 MG Tab Take as directed (Patient not taking: Reported on 11/5/2018) 6 Tab 0   • fexofenadine-pseudoephedrine (ALLEGRA-D)  MG per tablet Take 1 Tab by mouth 2 times a day. (Patient not taking: Reported on 11/5/2018) 30 Tab 0   • fluticasone (FLONASE) 50 MCG/ACT nasal spray Spray 1 Spray in nose 2 times a day. (Patient not taking: Reported on 11/5/2018) 1 Bottle 0   • meclizine (ANTIVERT) 25 MG Tab Take 1 Tab by mouth 3 times a day as needed. (Patient not taking: Reported on 7/27/2018) 30 Tab 0   • IBUPROFEN PO Take  by mouth.     • azithromycin (ZITHROMAX) 250 MG Tab z-lyle; U.D. (Patient not taking: Reported on 7/27/2018) 6 Tab 1   • albuterol (VENTOLIN OR PROVENTIL) 108 (90 BASE) MCG/ACT AERS inhalation aerosol Inhale 2 Puffs by mouth every four hours as needed for Shortness of Breath. With a spacer device (Patient not taking: Reported on 11/5/2018) 1 Inhaler 0     No current facility-administered medications on  "file prior to visit.      Social History     Social History   • Marital status: Single     Spouse name: N/A   • Number of children: N/A   • Years of education: N/A     Occupational History   • Not on file.     Social History Main Topics   • Smoking status: Current Every Day Smoker     Packs/day: 0.25     Types: Cigarettes   • Smokeless tobacco: Never Used   • Alcohol use No   • Drug use: No   • Sexual activity: No     Other Topics Concern   • Not on file     Social History Narrative   • No narrative on file     family history is not on file.      Review of Systems   Constitutional: Negative for chills, fever and malaise/fatigue.   Gastrointestinal: Positive for abdominal pain. Negative for diarrhea and nausea.   Genitourinary: Positive for flank pain. Negative for dysuria, frequency, hematuria and urgency.   Musculoskeletal: Negative for myalgias.   Neurological: Negative for dizziness and headaches.          Objective:     /82 (BP Location: Left arm, Patient Position: Sitting, BP Cuff Size: Adult)   Pulse 85   Temp 37 °C (98.6 °F) (Temporal)   Resp 16   Ht 1.626 m (5' 4\")   Wt 49.1 kg (108 lb 3.2 oz)   SpO2 99%   BMI 18.57 kg/m²      Physical Exam   Constitutional: She is oriented to person, place, and time. She appears well-developed and well-nourished. No distress.   Cardiovascular: Normal rate, regular rhythm and normal heart sounds.    No murmur heard.  Pulmonary/Chest: Effort normal and breath sounds normal. No respiratory distress.   Abdominal: Soft. Bowel sounds are normal. There is tenderness in the right lower quadrant. There is CVA tenderness. There is no rigidity, no rebound and no guarding.   Musculoskeletal: Normal range of motion.   Moves all 4 extremities normally   Neurological: She is alert and oriented to person, place, and time.   Skin: Skin is warm and dry.   Psychiatric: She has a normal mood and affect. Her behavior is normal. Thought content normal.   Nursing note and vitals " reviewed.              Assessment/Plan:     1. Acute right lower quadrant pain  US-APPENDIX   2. Flank pain  POCT Urinalysis    POCT Pregnancy      U/A and pregnancy are negative.  Ultrasound.  Will call with results.  She is given strict ED precautions if pain worsens prior to ultrasound.

## 2019-07-27 ENCOUNTER — TELEPHONE (OUTPATIENT)
Dept: URGENT CARE | Facility: MEDICAL CENTER | Age: 24
End: 2019-07-27

## 2021-09-22 ENCOUNTER — TELEPHONE (OUTPATIENT)
Dept: SCHEDULING | Facility: IMAGING CENTER | Age: 26
End: 2021-09-22

## 2021-09-28 ENCOUNTER — OFFICE VISIT (OUTPATIENT)
Dept: URGENT CARE | Facility: PHYSICIAN GROUP | Age: 26
End: 2021-09-28
Payer: COMMERCIAL

## 2021-09-28 ENCOUNTER — HOSPITAL ENCOUNTER (OUTPATIENT)
Facility: MEDICAL CENTER | Age: 26
End: 2021-09-28
Attending: PHYSICIAN ASSISTANT
Payer: COMMERCIAL

## 2021-09-28 VITALS
OXYGEN SATURATION: 98 % | TEMPERATURE: 97.8 F | SYSTOLIC BLOOD PRESSURE: 100 MMHG | RESPIRATION RATE: 12 BRPM | WEIGHT: 124 LBS | HEART RATE: 84 BPM | BODY MASS INDEX: 20.66 KG/M2 | HEIGHT: 65 IN | DIASTOLIC BLOOD PRESSURE: 62 MMHG

## 2021-09-28 DIAGNOSIS — R51.9 ACUTE NONINTRACTABLE HEADACHE, UNSPECIFIED HEADACHE TYPE: ICD-10-CM

## 2021-09-28 DIAGNOSIS — Z20.818 EXPOSURE TO STREP THROAT: ICD-10-CM

## 2021-09-28 DIAGNOSIS — J02.9 SORE THROAT: ICD-10-CM

## 2021-09-28 DIAGNOSIS — R11.0 NAUSEA: ICD-10-CM

## 2021-09-28 LAB
INT CON NEG: NORMAL
INT CON POS: NORMAL
S PYO AG THROAT QL: NEGATIVE

## 2021-09-28 PROCEDURE — U0005 INFEC AGEN DETEC AMPLI PROBE: HCPCS

## 2021-09-28 PROCEDURE — 99213 OFFICE O/P EST LOW 20 MIN: CPT | Performed by: PHYSICIAN ASSISTANT

## 2021-09-28 PROCEDURE — 87880 STREP A ASSAY W/OPTIC: CPT | Performed by: PHYSICIAN ASSISTANT

## 2021-09-28 PROCEDURE — U0003 INFECTIOUS AGENT DETECTION BY NUCLEIC ACID (DNA OR RNA); SEVERE ACUTE RESPIRATORY SYNDROME CORONAVIRUS 2 (SARS-COV-2) (CORONAVIRUS DISEASE [COVID-19]), AMPLIFIED PROBE TECHNIQUE, MAKING USE OF HIGH THROUGHPUT TECHNOLOGIES AS DESCRIBED BY CMS-2020-01-R: HCPCS

## 2021-09-28 RX ORDER — ONDANSETRON 4 MG/1
4 TABLET, ORALLY DISINTEGRATING ORAL EVERY 6 HOURS PRN
Qty: 10 TABLET | Refills: 0 | Status: SHIPPED | OUTPATIENT
Start: 2021-09-28 | End: 2021-10-01

## 2021-09-28 ASSESSMENT — ENCOUNTER SYMPTOMS
SORE THROAT: 1
HEADACHES: 1
NAUSEA: 1

## 2021-09-28 NOTE — PROGRESS NOTES
"Subjective:   Martha Joseph  is a 26 y.o. female who presents for Headache (light sensitivity) and Nausea        Headache   Associated symptoms include nausea and a sore throat.   Nausea  Associated symptoms include headaches, nausea and a sore throat.   Ms. Vaughn is a very pleasant 26-year-old female who presents to urgent care with 1 week history of headache and nausea.  Patient reports headache mostly behind both eyes with associated headache.  Patient denies any blurred vision.  She has had intermittent dizziness.  She has had no vomiting.  Patient denies any fever or chills.  She has had some fatigue.  She also has had mild sore throat.  She has had known exposure to strep in the past 2 weeks.  No known exposure to COVID-19.  No prior history of migraine headaches.  Denies any recent head injury.  Review of Systems   HENT: Positive for sore throat.    Gastrointestinal: Positive for nausea.   Neurological: Positive for headaches.   All other systems reviewed and are negative.    No Known Allergies  Reviewed past medical, surgical , social and family history.  Reviewed prescription and over-the-counter medications with patient and electronic health record today.     Objective:   /62   Pulse 84   Temp 36.6 °C (97.8 °F)   Resp 12   Ht 1.651 m (5' 5\")   Wt 56.2 kg (124 lb)   SpO2 98%   BMI 20.63 kg/m²   Physical Exam  Vitals and nursing note reviewed.   Constitutional:       General: She is not in acute distress.     Appearance: She is well-developed. She is not ill-appearing or toxic-appearing.   HENT:      Head: Normocephalic and atraumatic.      Right Ear: Tympanic membrane, ear canal and external ear normal.      Left Ear: Tympanic membrane, ear canal and external ear normal.      Nose: Nose normal.      Mouth/Throat:      Lips: Pink. No lesions.      Mouth: Mucous membranes are moist.      Pharynx: Oropharynx is clear. Uvula midline. No oropharyngeal exudate.   Eyes:      General: Lids are " normal.      Extraocular Movements: Extraocular movements intact.      Conjunctiva/sclera: Conjunctivae normal.      Pupils: Pupils are equal, round, and reactive to light.   Cardiovascular:      Rate and Rhythm: Normal rate and regular rhythm.      Heart sounds: Normal heart sounds. No murmur heard.   No friction rub. No gallop.    Pulmonary:      Effort: Pulmonary effort is normal. No respiratory distress.      Breath sounds: Normal breath sounds.   Abdominal:      General: Bowel sounds are normal. There is no distension.      Palpations: Abdomen is soft. There is no mass.      Tenderness: There is no abdominal tenderness. There is no guarding or rebound.   Musculoskeletal:         General: No tenderness or deformity. Normal range of motion.      Cervical back: Normal range of motion and neck supple.   Lymphadenopathy:      Head:      Right side of head: No submental, submandibular or tonsillar adenopathy.      Left side of head: No submental, submandibular or tonsillar adenopathy.      Cervical: No cervical adenopathy.      Upper Body:      Right upper body: No supraclavicular adenopathy.      Left upper body: No supraclavicular adenopathy.   Skin:     General: Skin is warm and dry.      Findings: No rash.   Neurological:      Mental Status: She is alert and oriented to person, place, and time.      Cranial Nerves: Cranial nerves are intact. No cranial nerve deficit, dysarthria or facial asymmetry.      Sensory: Sensation is intact. No sensory deficit.      Motor: Motor function is intact. No abnormal muscle tone or pronator drift.      Coordination: Coordination is intact. Romberg sign negative. Coordination normal. Finger-Nose-Finger Test and Heel to Shin Test normal. Rapid alternating movements normal.      Gait: Gait is intact.   Psychiatric:         Attention and Perception: Attention normal.         Mood and Affect: Mood and affect normal.         Speech: Speech normal.         Behavior: Behavior normal.  Behavior is cooperative.         Thought Content: Thought content normal.         Judgment: Judgment normal.           Assessment/Plan:   1. Acute nonintractable headache, unspecified headache type  - POCT Rapid Strep A  - SARS-CoV-2 PCR (24 hour In-House): Collect NP swab in VTM; Future    2. Nausea  - SARS-CoV-2 PCR (24 hour In-House): Collect NP swab in VTM; Future  - ondansetron (ZOFRAN ODT) 4 MG TABLET DISPERSIBLE; Take 1 Tablet by mouth every 6 hours as needed for Nausea for up to 3 days.  Dispense: 10 Tablet; Refill: 0    3. Exposure to strep throat  - POCT Rapid Strep A  - SARS-CoV-2 PCR (24 hour In-House): Collect NP swab in VTM; Future    4. Sore throat  - POCT Rapid Strep A  - SARS-CoV-2 PCR (24 hour In-House): Collect NP swab in VTM; Future      Rapid strep: Negative  Testing performed for COVID-19.  Patient/guardian is given printed /MyChart instructions regarding self-isolation.  Work/school note is provided with specific return to work/school protocols.  Reviewed with patient/guardian that if they do test positive they will be contacted by their local health department regarding return to work/school protocols.  Results will also be released to patient/guardian in MyChart or called to the patient/guardian directly.  Encouraged mask use, frequent handwashing, wiping down hard surfaces, etc.    Offered Toradol.  Patient declines.  Patient is given Zofran as needed for nausea.  May continue ibuprofen as needed for pain not to exceed recommended daily dose.    Increase fluids, rest.  Patient may use salt water gargles, ice pops, cool fluids.        Differential diagnosis, natural history, supportive care, and indications for immediate follow-up discussed.     Red flag warning symptoms and strict ER/follow-up precautions given.  The patient demonstrated a good understanding and agreed with the treatment plan.    Upon entering exam room I ensured patient was wearing a mask.  This provider wore appropriate  PPE throughout entire visit.  Patient wore mask entire visit except for a brief period while examining oropharynx.    Please note that this note was created using voice recognition speech to text software. Every effort has been made to correct obvious errors.  However, I expect there are errors of grammar and possibly context that were not discovered prior to finalizing the note  DRERICK Murrell PA-C

## 2021-09-29 LAB — COVID ORDER STATUS COVID19: NORMAL

## 2021-09-30 LAB
SARS-COV-2 RNA RESP QL NAA+PROBE: NOTDETECTED
SPECIMEN SOURCE: NORMAL

## 2021-10-13 ENCOUNTER — OFFICE VISIT (OUTPATIENT)
Dept: MEDICAL GROUP | Facility: MEDICAL CENTER | Age: 26
End: 2021-10-13
Payer: COMMERCIAL

## 2021-10-13 ENCOUNTER — HOSPITAL ENCOUNTER (OUTPATIENT)
Facility: MEDICAL CENTER | Age: 26
End: 2021-10-13
Attending: PHYSICIAN ASSISTANT
Payer: COMMERCIAL

## 2021-10-13 VITALS
TEMPERATURE: 98.7 F | HEART RATE: 100 BPM | OXYGEN SATURATION: 98 % | SYSTOLIC BLOOD PRESSURE: 96 MMHG | WEIGHT: 108.47 LBS | DIASTOLIC BLOOD PRESSURE: 60 MMHG | HEIGHT: 65 IN | RESPIRATION RATE: 16 BRPM | BODY MASS INDEX: 18.07 KG/M2

## 2021-10-13 DIAGNOSIS — G37.3 ACUTE TRANSVERSE MYELITIS (HCC): ICD-10-CM

## 2021-10-13 DIAGNOSIS — R30.0 DYSURIA: ICD-10-CM

## 2021-10-13 DIAGNOSIS — N39.0 URINARY TRACT INFECTION WITHOUT HEMATURIA, SITE UNSPECIFIED: ICD-10-CM

## 2021-10-13 LAB
APPEARANCE UR: CLEAR
BILIRUB UR STRIP-MCNC: NORMAL MG/DL
COLOR UR AUTO: YELLOW
GLUCOSE UR STRIP.AUTO-MCNC: NORMAL MG/DL
KETONES UR STRIP.AUTO-MCNC: NORMAL MG/DL
LEUKOCYTE ESTERASE UR QL STRIP.AUTO: NORMAL
NITRITE UR QL STRIP.AUTO: NORMAL
PH UR STRIP.AUTO: 7 [PH] (ref 5–8)
PROT UR QL STRIP: NORMAL MG/DL
RBC UR QL AUTO: NORMAL
SP GR UR STRIP.AUTO: 1.01
UROBILINOGEN UR STRIP-MCNC: 0.2 MG/DL

## 2021-10-13 PROCEDURE — 87077 CULTURE AEROBIC IDENTIFY: CPT

## 2021-10-13 PROCEDURE — 81002 URINALYSIS NONAUTO W/O SCOPE: CPT | Performed by: PHYSICIAN ASSISTANT

## 2021-10-13 PROCEDURE — 99000 SPECIMEN HANDLING OFFICE-LAB: CPT | Performed by: PHYSICIAN ASSISTANT

## 2021-10-13 PROCEDURE — 99213 OFFICE O/P EST LOW 20 MIN: CPT | Performed by: PHYSICIAN ASSISTANT

## 2021-10-13 PROCEDURE — 87186 SC STD MICRODIL/AGAR DIL: CPT

## 2021-10-13 PROCEDURE — 87086 URINE CULTURE/COLONY COUNT: CPT

## 2021-10-13 RX ORDER — CIPROFLOXACIN 500 MG/1
500 TABLET, FILM COATED ORAL 2 TIMES DAILY
Qty: 10 TABLET | Refills: 0 | Status: SHIPPED | OUTPATIENT
Start: 2021-10-13 | End: 2021-10-18

## 2021-10-13 ASSESSMENT — PATIENT HEALTH QUESTIONNAIRE - PHQ9
5. POOR APPETITE OR OVEREATING: 0 - NOT AT ALL
SUM OF ALL RESPONSES TO PHQ QUESTIONS 1-9: 6
CLINICAL INTERPRETATION OF PHQ2 SCORE: 2

## 2021-10-13 NOTE — PROGRESS NOTES
"Subjective:   CC:   Chief Complaint   Patient presents with   • Establish Care     gen labs    • Dysuria     odor x 2 months ,off and on        Martha Joseph is a 26 y.o. female here today for establishing care.    Patient has been having dysuria, urine odor on and off for couple months now.  Due to lack of insurance has not been able to seek medical attention.  Recently started to have pain over flank area left worse than right, has had occasional nausea, fever, no fever or nausea today.            Current medicines (including changes today)  Current Outpatient Medications   Medication Sig Dispense Refill   • ciprofloxacin (CIPRO) 500 MG Tab Take 1 Tablet by mouth 2 times a day for 5 days. 10 Tablet 0     No current facility-administered medications for this visit.         Past medical, surgical, family, and social history are reviewed in Epic chart by me today.   Medications and allergies reviewed in Epic chart by me today.         ROS   No chest pain, no shortness of breath, no abdominal pain  As documented in history of present illness above     Objective:     BP (!) 96/60 (BP Location: Left arm, Patient Position: Sitting)   Pulse 100   Temp 37.1 °C (98.7 °F) (Temporal)   Resp 16   Ht 1.651 m (5' 5\")   Wt 49.2 kg (108 lb 7.5 oz)   SpO2 98%  Body mass index is 18.05 kg/m².   Physical Exam:  Constitutional: Alert, oriented in no acute distress.  Psych: Eye contact is good, speech goal directed, affect calm  Eyes: Conjunctiva non-injected, sclera non-icteric.  ENMT: Ears:Pinna normal. TM pearly gray.               Lips without lesions, Clear oropharynx, mucous membranes pink and moist.  Neck: No cervical or supraclavicular lymphadenopathy,Trachea midline, no thyromegaly, no masses  Lungs: Unlabored respiratory effort, clear to auscultation bilaterally with good excursion, no wheez or rhonci  CV: regular rate and rhythm. No lower extremity edema  Abdomen: soft, pos ttp over lower abdomen, pos CVAT  Skin: no " lesions in visible areas.  Ext: no edema, color normal, vascularity normal, temperature normal        Assessment and Plan:   The following treatment plan was discussed      1. Acute transverse myelitis (HCC)      2. Urinary tract infection without hematuria, site unspecified    - ciprofloxacin (CIPRO) 500 MG Tab; Take 1 Tablet by mouth 2 times a day for 5 days.  Dispense: 10 Tablet; Refill: 0  - URINE CULTURE    3. Dysuria  - URINE CULTURE      Followup: prn          Please note that this dictation was created using voice recognition software. I have made every reasonable attempt to correct obvious errors, but I expect that there are errors of grammar and possibly content that I did not discover before finalizing the note.

## 2021-10-17 LAB
BACTERIA UR CULT: ABNORMAL
BACTERIA UR CULT: ABNORMAL
SIGNIFICANT IND 70042: ABNORMAL
SITE SITE: ABNORMAL
SOURCE SOURCE: ABNORMAL

## 2021-10-19 ENCOUNTER — TELEPHONE (OUTPATIENT)
Dept: MEDICAL GROUP | Facility: MEDICAL CENTER | Age: 26
End: 2021-10-19

## 2021-10-19 NOTE — TELEPHONE ENCOUNTER
Phone Number Called: [phone] (home)     Call outcome: Spoke to patient regarding message below.    Message: Pt informed. Stated that she is still having the feeling of urgency. Tangela,please advise.      ----- Message from Tangela Grace P.A.-C. sent at 10/19/2021  2:02 PM PDT -----  Culture result is back, positive for E. coli UTI infection and shows positive sensitivity to Cipro.    Tangela Grace P.A.-C.

## 2021-10-22 DIAGNOSIS — R39.15 URINARY URGENCY: ICD-10-CM

## 2021-10-22 RX ORDER — NITROFURANTOIN 25; 75 MG/1; MG/1
100 CAPSULE ORAL 2 TIMES DAILY
Qty: 10 CAPSULE | Refills: 0 | Status: SHIPPED | OUTPATIENT
Start: 2021-10-22 | End: 2021-10-27

## 2021-10-22 NOTE — PROGRESS NOTES
Please inform patient that   I ordered a UA to be done at the lab to check and see if she has cleared the UTI.  She can try Azo over-the-counter, that can help calm down urinary track to see if that helps resolve the urinary urgency.  I recommend starting it after UA    Tangela Grace P.A.-C.

## 2021-10-22 NOTE — TELEPHONE ENCOUNTER
"Phone Number Called: [phone] (home)     Call outcome: Spoke to patient regarding message below.    Message: Spoke with pt. She states she is still experiencing urgency and odor that \"comes and goes\" will get lab done. Tangela,please note.    "

## 2021-10-23 ENCOUNTER — HOSPITAL ENCOUNTER (OUTPATIENT)
Facility: MEDICAL CENTER | Age: 26
End: 2021-10-23
Attending: PHYSICIAN ASSISTANT
Payer: COMMERCIAL

## 2021-10-23 DIAGNOSIS — R39.15 URINARY URGENCY: ICD-10-CM

## 2021-10-23 LAB
APPEARANCE UR: CLEAR
BILIRUB UR QL STRIP.AUTO: NEGATIVE
COLOR UR: YELLOW
GLUCOSE UR STRIP.AUTO-MCNC: NEGATIVE MG/DL
KETONES UR STRIP.AUTO-MCNC: NEGATIVE MG/DL
LEUKOCYTE ESTERASE UR QL STRIP.AUTO: NEGATIVE
MICRO URNS: NORMAL
NITRITE UR QL STRIP.AUTO: NEGATIVE
PH UR STRIP.AUTO: 6.5 [PH] (ref 5–8)
PROT UR QL STRIP: NEGATIVE MG/DL
RBC UR QL AUTO: NEGATIVE
SP GR UR STRIP.AUTO: 1.01
UROBILINOGEN UR STRIP.AUTO-MCNC: 0.2 MG/DL

## 2021-10-23 PROCEDURE — 81003 URINALYSIS AUTO W/O SCOPE: CPT

## 2021-10-27 ENCOUNTER — TELEPHONE (OUTPATIENT)
Dept: MEDICAL GROUP | Facility: MEDICAL CENTER | Age: 26
End: 2021-10-27

## 2021-10-28 NOTE — TELEPHONE ENCOUNTER
Phone Number Called: [phone] (home)     Call outcome: Spoke to patient regarding message below.    Message: Pt informed,states she feels a lot better. She is inquiring about birth control. She state that you informed her you would be able to send an rx for her. Would pt need another appt with you? Please advise,        ----- Message from Tangela Grace P.A.-C. sent at 10/27/2021  9:43 AM PDT -----  Please inform patient that   Hey UA results were neg for infection.    Tangela Grace P.A.-C.

## 2021-12-21 ENCOUNTER — HOSPITAL ENCOUNTER (OUTPATIENT)
Facility: MEDICAL CENTER | Age: 26
End: 2021-12-21
Attending: PHYSICIAN ASSISTANT
Payer: COMMERCIAL

## 2021-12-21 ENCOUNTER — OFFICE VISIT (OUTPATIENT)
Dept: MEDICAL GROUP | Facility: MEDICAL CENTER | Age: 26
End: 2021-12-21
Payer: COMMERCIAL

## 2021-12-21 VITALS
TEMPERATURE: 98.6 F | OXYGEN SATURATION: 96 % | DIASTOLIC BLOOD PRESSURE: 64 MMHG | HEIGHT: 65 IN | HEART RATE: 80 BPM | SYSTOLIC BLOOD PRESSURE: 102 MMHG | BODY MASS INDEX: 18.07 KG/M2 | WEIGHT: 108.47 LBS | RESPIRATION RATE: 16 BRPM

## 2021-12-21 DIAGNOSIS — J02.9 PHARYNGITIS, UNSPECIFIED ETIOLOGY: ICD-10-CM

## 2021-12-21 DIAGNOSIS — H92.01 RIGHT EAR PAIN: ICD-10-CM

## 2021-12-21 LAB
EXTERNAL QUALITY CONTROL: NORMAL
FLUAV+FLUBV AG SPEC QL IA: NORMAL
INT CON NEG: NEGATIVE
INT CON NEG: NEGATIVE
INT CON POS: POSITIVE
INT CON POS: POSITIVE
S PYO AG THROAT QL: NORMAL
SARS-COV+SARS-COV-2 AG RESP QL IA.RAPID: NEGATIVE

## 2021-12-21 PROCEDURE — U0003 INFECTIOUS AGENT DETECTION BY NUCLEIC ACID (DNA OR RNA); SEVERE ACUTE RESPIRATORY SYNDROME CORONAVIRUS 2 (SARS-COV-2) (CORONAVIRUS DISEASE [COVID-19]), AMPLIFIED PROBE TECHNIQUE, MAKING USE OF HIGH THROUGHPUT TECHNOLOGIES AS DESCRIBED BY CMS-2020-01-R: HCPCS

## 2021-12-21 PROCEDURE — 87426 SARSCOV CORONAVIRUS AG IA: CPT | Performed by: PHYSICIAN ASSISTANT

## 2021-12-21 PROCEDURE — U0005 INFEC AGEN DETEC AMPLI PROBE: HCPCS

## 2021-12-21 PROCEDURE — 87804 INFLUENZA ASSAY W/OPTIC: CPT | Performed by: PHYSICIAN ASSISTANT

## 2021-12-21 PROCEDURE — 99213 OFFICE O/P EST LOW 20 MIN: CPT | Performed by: PHYSICIAN ASSISTANT

## 2021-12-21 PROCEDURE — 87880 STREP A ASSAY W/OPTIC: CPT | Performed by: PHYSICIAN ASSISTANT

## 2021-12-21 RX ORDER — IBUPROFEN 200 MG
200 TABLET ORAL EVERY 6 HOURS PRN
COMMUNITY
End: 2022-01-11

## 2021-12-21 NOTE — PROGRESS NOTES
"Chief Complaint   Patient presents with   • Headache     sore throat,fever        HPI  Martha Joseph is a 26 y.o. female here today for pharyngitis and right ear pain for 3 days.    Woke up w sore throat on Saturday, did not check temperature but she felt feverish and had the chills with body aches.  Pos R ear pain and ache since yesterday, pos sinus congestion and HA, change in smell and taste.  Patient has had tonsillectomy when she was 7, has had a strep throat in the past post surgery    Patient has not been vaccinated for COVID or flu, patient does not get vaccinated due to history of transverse myelitis following hep A vaccine at 14 years old. No sick contact         Exam:  /64 (BP Location: Left arm, Patient Position: Sitting)   Pulse 80   Temp 37 °C (98.6 °F) (Temporal)   Resp 16   Ht 1.651 m (5' 5\")   Wt 49.2 kg (108 lb 7.5 oz)   SpO2 96%       Constitutional: Alert, oriented in no acute distress.  Psych: Eye contact is good, speech goal directed, affect calm  Eyes: Conjunctiva non-injected, sclera non-icteric.  Lungs: Unlabored respiratory effort, clear to auscultation bilaterally with good excursion, no wheez or rhonci  CV: regular rate and rhythm. No lower extremity edema  Ear: Right TM appears with erythema and congestion, no pus noted behind eardrum, left TM pearly gray without any erythema or pus behind eardrum.  Throat: Tonsils are absent, there is some erythema over pharynx, no pus noted, positive cervical lymphadenopathy.        A/P:    1. Pharyngitis, unspecified etiology    - POCT Rapid Strep A  - SARS-CoV-2, PCR (In-House); Future  - POCT SARS-COV Antigen [facility] (Symptomatic Only)  - POCT Influenza A/B    2. Right ear pain    Advised pseudofed, Flonase, tylenol ibuprofen,  Afebrile     Patient is not vaccinated, has not taken any medicine today and patient is afebrile,      F/U: if Sx fails to improve in 7 days, sooner if worsen   "

## 2021-12-21 NOTE — LETTER
December 21, 2021         Patient: Martha Joseph   YOB: 1995   Date of Visit: 12/21/2021           To Whom it May Concern:    Martha Joseph was seen in my clinic on 12/21/2021. Please excuse her absence on 12/20/21 and 12/21/21.    If you have any questions or concerns, please don't hesitate to call.        Sincerely,           Tangela Grace P.A.-C.  Electronically Signed

## 2021-12-22 LAB
COVID ORDER STATUS COVID19: NORMAL
SARS-COV-2 RNA RESP QL NAA+PROBE: NOTDETECTED
SPECIMEN SOURCE: NORMAL

## 2021-12-27 ENCOUNTER — TELEPHONE (OUTPATIENT)
Dept: MEDICAL GROUP | Facility: MEDICAL CENTER | Age: 26
End: 2021-12-27

## 2021-12-27 NOTE — TELEPHONE ENCOUNTER
Phone Number Called: [phone] (home)     Call outcome: Spoke to patient regarding message below.    Message: pt. Informed/ verbalized understanding. She states her symptoms are better but she still has some congestion and ear drainage. She states she doesn't need an appointment at this time.

## 2021-12-27 NOTE — TELEPHONE ENCOUNTER
----- Message from Ekta Byrd D.O. sent at 12/26/2021  1:24 PM PST -----  Please call pt and inform her that her COVID19 test was negative (normal).  Please have patient schedule a follow-up appointment for re-evaluation if her symptoms have continued or worsened. -Dr. Ekta Byrd (covering for Tangela who is out of the office this coming week)

## 2022-01-11 ENCOUNTER — HOSPITAL ENCOUNTER (OUTPATIENT)
Facility: MEDICAL CENTER | Age: 27
End: 2022-01-11
Attending: FAMILY MEDICINE
Payer: COMMERCIAL

## 2022-01-11 ENCOUNTER — OFFICE VISIT (OUTPATIENT)
Dept: URGENT CARE | Facility: PHYSICIAN GROUP | Age: 27
End: 2022-01-11
Payer: COMMERCIAL

## 2022-01-11 VITALS
TEMPERATURE: 100.7 F | DIASTOLIC BLOOD PRESSURE: 66 MMHG | SYSTOLIC BLOOD PRESSURE: 100 MMHG | WEIGHT: 108.8 LBS | RESPIRATION RATE: 16 BRPM | BODY MASS INDEX: 18.13 KG/M2 | OXYGEN SATURATION: 98 % | HEIGHT: 65 IN | HEART RATE: 88 BPM

## 2022-01-11 DIAGNOSIS — J98.8 RTI (RESPIRATORY TRACT INFECTION): ICD-10-CM

## 2022-01-11 DIAGNOSIS — R50.9 FEVER, UNSPECIFIED FEVER CAUSE: ICD-10-CM

## 2022-01-11 LAB
FLUAV+FLUBV AG SPEC QL IA: NEGATIVE
INT CON NEG: NEGATIVE
INT CON POS: POSITIVE

## 2022-01-11 PROCEDURE — 99213 OFFICE O/P EST LOW 20 MIN: CPT | Performed by: FAMILY MEDICINE

## 2022-01-11 PROCEDURE — U0003 INFECTIOUS AGENT DETECTION BY NUCLEIC ACID (DNA OR RNA); SEVERE ACUTE RESPIRATORY SYNDROME CORONAVIRUS 2 (SARS-COV-2) (CORONAVIRUS DISEASE [COVID-19]), AMPLIFIED PROBE TECHNIQUE, MAKING USE OF HIGH THROUGHPUT TECHNOLOGIES AS DESCRIBED BY CMS-2020-01-R: HCPCS

## 2022-01-11 PROCEDURE — U0005 INFEC AGEN DETEC AMPLI PROBE: HCPCS

## 2022-01-11 PROCEDURE — 87804 INFLUENZA ASSAY W/OPTIC: CPT | Performed by: FAMILY MEDICINE

## 2022-01-11 RX ORDER — IBUPROFEN 600 MG/1
600 TABLET ORAL EVERY 8 HOURS PRN
Qty: 15 TABLET | Refills: 1 | Status: SHIPPED | OUTPATIENT
Start: 2022-01-11

## 2022-01-11 RX ORDER — DEXTROMETHORPHAN HYDROBROMIDE AND PROMETHAZINE HYDROCHLORIDE 15; 6.25 MG/5ML; MG/5ML
5 SYRUP ORAL EVERY 6 HOURS PRN
Qty: 120 ML | Refills: 0 | Status: SHIPPED | OUTPATIENT
Start: 2022-01-11

## 2022-01-11 RX ORDER — DOXYCYCLINE HYCLATE 100 MG
100 TABLET ORAL EVERY 12 HOURS
Qty: 14 TABLET | Refills: 0 | Status: SHIPPED | OUTPATIENT
Start: 2022-01-11 | End: 2022-01-18

## 2022-01-11 ASSESSMENT — ENCOUNTER SYMPTOMS
SORE THROAT: 1
HEADACHES: 1
SHORTNESS OF BREATH: 1
FEVER: 1

## 2022-01-11 NOTE — LETTER
January 11, 2022         Patient: Martha Joseph   YOB: 1995   Date of Visit: 1/11/2022           To Whom it May Concern:    Martha Joseph was seen in my clinic on 1/11/2022. She may return to work in 2-3 days.    If you have any questions or concerns, please don't hesitate to call.        Sincerely,           Yobani Liz M.D.  Electronically Signed

## 2022-01-11 NOTE — PROGRESS NOTES
"Subjective     Martha Joseph is a 26 y.o. female who presents with Fever (x 2 days 101), Shortness of Breath (has been sick for a month but got worse yesterday), Headache, and Pharyngitis      - This is a pleasant and nontoxic appearing 26 y.o. female who has come to the walk-in clinic today for:    #1) ongoing cold for ~ 4 weeks (sinus congestion w/ dc and cough). Was improving slowly but in past day developed sudden onset aches-malaise, fever, headaches and worsening cough/congestion, sore throat and chest congestion      ALLERGIES:  Patient has no known allergies.     PMH:  Past Medical History:   Diagnosis Date   • Transverse myelitis (HCC)         PSH:  Past Surgical History:   Procedure Laterality Date   • TONSILLECTOMY         MEDS:    Current Outpatient Medications:   •  doxycycline (VIBRAMYCIN) 100 MG Tab, Take 1 Tablet by mouth every 12 hours for 7 days., Disp: 14 Tablet, Rfl: 0  •  promethazine-dextromethorphan (PROMETHAZINE-DM) 6.25-15 MG/5ML syrup, Take 5 mL by mouth every 6 hours as needed for Cough., Disp: 120 mL, Rfl: 0  •  ibuprofen (MOTRIN) 600 MG Tab, Take 1 Tablet by mouth every 8 hours as needed for Mild Pain, Fever or Headache., Disp: 15 Tablet, Rfl: 1    ** I have documented what I find to be significant in regards to past medical, social, family and surgical history  in my HPI or under PMH/PSH/FH review section, otherwise it is noncontributory **           HPI    Review of Systems   Constitutional: Positive for fever.   HENT: Positive for sore throat.    Respiratory: Positive for shortness of breath.    Neurological: Positive for headaches.   All other systems reviewed and are negative.             Objective     /66   Pulse 88   Temp (!) 38.2 °C (100.7 °F) (Temporal)   Resp 16   Ht 1.651 m (5' 5\")   Wt 49.4 kg (108 lb 12.8 oz)   SpO2 98%   BMI 18.11 kg/m²      Physical Exam  Vitals and nursing note reviewed.   Constitutional:       General: She is not in acute distress.     " Appearance: Normal appearance. She is well-developed.   HENT:      Head: Normocephalic and atraumatic.   Eyes:      General: No scleral icterus.  Cardiovascular:      Heart sounds: Normal heart sounds. No murmur heard.      Pulmonary:      Effort: Pulmonary effort is normal. No respiratory distress.      Breath sounds: Normal breath sounds.   Skin:     Coloration: Skin is not jaundiced or pale.   Neurological:      Mental Status: She is alert.      Motor: No abnormal muscle tone.   Psychiatric:         Mood and Affect: Mood normal.         Behavior: Behavior normal.         Assessment & Plan       1. Fever, unspecified fever cause  POCT Influenza A/B    SARS-CoV-2 PCR (24 hour In-House): Collect NP swab in VTM    ibuprofen (MOTRIN) 600 MG Tab   2. RTI (respiratory tract infection)  doxycycline (VIBRAMYCIN) 100 MG Tab    promethazine-dextromethorphan (PROMETHAZINE-DM) 6.25-15 MG/5ML syrup    ibuprofen (MOTRIN) 600 MG Tab       - Dx, plan & d/c instructions discussed   - Rest, stay hydrated, OTC Tylenol as needed  - E.R. precautions discussed     Asked to kindly follow up with their PCP's office in 2-3 days for a recheck, ER if not improving or feeling/getting worse.    Any realistic side effects of medications that may have been given today reviewed.     Patient left in stable condition     POCT results reviewed/discussed

## 2022-01-12 LAB
COVID ORDER STATUS COVID19: NORMAL
SARS-COV-2 RNA RESP QL NAA+PROBE: DETECTED
SPECIMEN SOURCE: ABNORMAL

## 2022-01-13 ENCOUNTER — TELEPHONE (OUTPATIENT)
Dept: URGENT CARE | Facility: PHYSICIAN GROUP | Age: 27
End: 2022-01-13

## 2022-01-13 NOTE — TELEPHONE ENCOUNTER
Kindly call (DOCUMENT CALL OR ATTEMPTED CALL IN [facility]) and let patient know:      Hi,     Your recent coronavirus nose swab results are back and coronavirus was detected. This means that you do have coronavirus.      Kindly self isolate at home for 10 days from the start of your symptoms. For most people this will be like a mild cold or flu. If you start feeling worse, develop trouble breathing or chest pain please go to ER.     Feel free to call clinic with any questions, thanks

## 2022-10-19 ENCOUNTER — OFFICE VISIT (OUTPATIENT)
Dept: MEDICAL GROUP | Facility: IMAGING CENTER | Age: 27
End: 2022-10-19
Payer: COMMERCIAL

## 2022-10-19 ENCOUNTER — HOSPITAL ENCOUNTER (OUTPATIENT)
Facility: MEDICAL CENTER | Age: 27
End: 2022-10-19
Payer: COMMERCIAL

## 2022-10-19 VITALS
SYSTOLIC BLOOD PRESSURE: 98 MMHG | WEIGHT: 110.6 LBS | DIASTOLIC BLOOD PRESSURE: 58 MMHG | BODY MASS INDEX: 18.43 KG/M2 | OXYGEN SATURATION: 96 % | RESPIRATION RATE: 18 BRPM | HEIGHT: 65 IN | HEART RATE: 83 BPM | TEMPERATURE: 97.9 F

## 2022-10-19 DIAGNOSIS — R30.0 DYSURIA: ICD-10-CM

## 2022-10-19 DIAGNOSIS — N30.01 ACUTE CYSTITIS WITH HEMATURIA: ICD-10-CM

## 2022-10-19 DIAGNOSIS — Z30.41 ORAL CONTRACEPTIVE USE: ICD-10-CM

## 2022-10-19 LAB
APPEARANCE UR: NORMAL
BILIRUB UR STRIP-MCNC: NORMAL MG/DL
COLOR UR AUTO: NORMAL
GLUCOSE UR STRIP.AUTO-MCNC: NORMAL MG/DL
KETONES UR STRIP.AUTO-MCNC: NORMAL MG/DL
LEUKOCYTE ESTERASE UR QL STRIP.AUTO: NORMAL
NITRITE UR QL STRIP.AUTO: NORMAL
PH UR STRIP.AUTO: 7.5 [PH] (ref 5–8)
PROT UR QL STRIP: NORMAL MG/DL
RBC UR QL AUTO: NORMAL
SP GR UR STRIP.AUTO: 1.02
UROBILINOGEN UR STRIP-MCNC: 0.2 MG/DL

## 2022-10-19 PROCEDURE — 87077 CULTURE AEROBIC IDENTIFY: CPT | Mod: 91

## 2022-10-19 PROCEDURE — 99213 OFFICE O/P EST LOW 20 MIN: CPT

## 2022-10-19 PROCEDURE — 87086 URINE CULTURE/COLONY COUNT: CPT

## 2022-10-19 PROCEDURE — 87186 SC STD MICRODIL/AGAR DIL: CPT

## 2022-10-19 PROCEDURE — 81002 URINALYSIS NONAUTO W/O SCOPE: CPT

## 2022-10-19 RX ORDER — NITROFURANTOIN 25; 75 MG/1; MG/1
100 CAPSULE ORAL EVERY 12 HOURS
Qty: 10 CAPSULE | Refills: 0 | Status: SHIPPED | OUTPATIENT
Start: 2022-10-19 | End: 2022-10-24

## 2022-10-19 RX ORDER — PHENAZOPYRIDINE HYDROCHLORIDE 200 MG/1
200 TABLET, FILM COATED ORAL 3 TIMES DAILY PRN
Qty: 6 TABLET | Refills: 0 | Status: SHIPPED | OUTPATIENT
Start: 2022-10-19 | End: 2022-10-21

## 2022-10-19 ASSESSMENT — PATIENT HEALTH QUESTIONNAIRE - PHQ9: CLINICAL INTERPRETATION OF PHQ2 SCORE: 0

## 2022-10-19 ASSESSMENT — PAIN SCALES - GENERAL: PAINLEVEL: 2=MINIMAL-SLIGHT

## 2022-10-19 NOTE — PATIENT INSTRUCTIONS
Urinary Tract Infection, Adult  A urinary tract infection (UTI) is an infection of any part of the urinary tract. The urinary tract includes:  The kidneys.  The ureters.  The bladder.  The urethra.  These organs make, store, and get rid of pee (urine) in the body.  What are the causes?  This is caused by germs (bacteria) in your genital area. These germs grow and cause swelling (inflammation) of your urinary tract.  What increases the risk?  You are more likely to develop this condition if:  You have a small, thin tube (catheter) to drain pee.  You cannot control when you pee or poop (incontinence).  You are female, and:  You use these methods to prevent pregnancy:  A medicine that kills sperm (spermicide).  A device that blocks sperm (diaphragm).  You have low levels of a female hormone (estrogen).  You are pregnant.  You have genes that add to your risk.  You are sexually active.  You take antibiotic medicines.  You have trouble peeing because of:  A prostate that is bigger than normal, if you are male.  A blockage in the part of your body that drains pee from the bladder (urethra).  A kidney stone.  A nerve condition that affects your bladder (neurogenic bladder).  Not getting enough to drink.  Not peeing often enough.  You have other conditions, such as:  Diabetes.  A weak disease-fighting system (immune system).  Sickle cell disease.  Gout.  Injury of the spine.  What are the signs or symptoms?  Symptoms of this condition include:  Needing to pee right away (urgently).  Peeing often.  Peeing small amounts often.  Pain or burning when peeing.  Blood in the pee.  Pee that smells bad or not like normal.  Trouble peeing.  Pee that is cloudy.  Fluid coming from the vagina, if you are female.  Pain in the belly or lower back.  Other symptoms include:  Throwing up (vomiting).  No urge to eat.  Feeling mixed up (confused).  Being tired and grouchy (irritable).  A fever.  Watery poop (diarrhea).  How is this  treated?  This condition may be treated with:  Antibiotic medicine.  Other medicines.  Drinking enough water.  Follow these instructions at home:    Medicines  Take over-the-counter and prescription medicines only as told by your doctor.  If you were prescribed an antibiotic medicine, take it as told by your doctor. Do not stop taking it even if you start to feel better.  General instructions  Make sure you:  Pee until your bladder is empty.  Do not hold pee for a long time.  Empty your bladder after sex.  Wipe from front to back after pooping if you are a female. Use each tissue one time when you wipe.  Drink enough fluid to keep your pee pale yellow.  Keep all follow-up visits as told by your doctor. This is important.  Contact a doctor if:  You do not get better after 1-2 days.  Your symptoms go away and then come back.  Get help right away if:  You have very bad back pain.  You have very bad pain in your lower belly.  You have a fever.  You are sick to your stomach (nauseous).  You are throwing up.  Summary  A urinary tract infection (UTI) is an infection of any part of the urinary tract.  This condition is caused by germs in your genital area.  There are many risk factors for a UTI. These include having a small, thin tube to drain pee and not being able to control when you pee or poop.  Treatment includes antibiotic medicines for germs.  Drink enough fluid to keep your pee pale yellow.  This information is not intended to replace advice given to you by your health care provider. Make sure you discuss any questions you have with your health care provider.  Document Released: 06/05/2009 Document Revised: 12/05/2019 Document Reviewed: 06/27/2019  [employer]MENT Patient Education © 2020 [employer]

## 2022-10-19 NOTE — PROGRESS NOTES
"  Chief Complaint   Patient presents with    UTI     Pt stated 2 or 3 days ago notice discharge,odor,,burn and constant peeiing       HPI:  Acute cystitis  Symptom onset: 2 to 3 days ago   Current symptoms: Painful, urgent, frequent voids. Smells like \"asparagus\" No blood noted in urine.  Since onset symptoms are: Worsening, now having suprapubic pressure.  Treatments tried: None  Associated symptoms: Negative for fever, flank pain, nausea and vomiting, vaginal discharge, pelvic pain.  History is positive for frequent UTI.   Denies fever, chills, vomiting or abdominal pain.    Oral contraceptive  Patient presents today to  request refill of her oral contraceptive. She reports of tolerating medication well without side effects. Reports heavy, monthly periods. Does have cramping, takes OTC medication. Denies severe PMS symptoms.  Patient denies possibility of pregnancy.   Patient denies current nicotine-vape use.  Patient denies personal or family history of blood clot, or hematological conditions.    OBJECTIVE:  BP (!) 98/58   Pulse 83   Temp 36.6 °C (97.9 °F) (Temporal)   Resp 18   Ht 1.651 m (5' 5\")   Wt 50.2 kg (110 lb 9.6 oz)   SpO2 96%   Gen: Alert, NAD.  Chest: Lungs clear to auscultation, CV RRR.  Abdomen: Soft, tender in suprapubic region. No CVAT. Normal bowel sounds.     Lab Results   Component Value Date    POCCOLOR yellow 10/13/2021    POCAPPEAR clear 10/13/2021    POCLEUKEST neg 10/13/2021    POCNITRITE pos 10/13/2021    POCUROBILIGE 0.2 10/13/2021    POCPROTEIN neg 10/13/2021    POCURPH 7.0 10/13/2021    POCBLOOD neg 10/13/2021    POCSPGRV 1.015 10/13/2021    POCKETONES neg 10/13/2021    POCBILIRUBIN neg 10/13/2021    POCGLUCUA neg 10/13/2021          ASSESSMENT/PLAN:  1. Acute cystitis with hematuria  2. Dysuria  1. Abnormal urine dipstick in office. Urine sent for culture. Start antibiotics.  2. Provided education to drink plenty of fluids, wipe front to back every void and bowel movement.   3. " Return to clinic if symptoms not improving within 3-4 days or in case of vomiting, fever, increasing pain.    - POCT Urinalysis  - Urine Culture; Future  - nitrofurantoin (MACROBID) 100 MG Cap; Take 1 Capsule by mouth every 12 hours for 5 days.  Dispense: 10 Capsule; Refill: 0  - phenazopyridine (PYRIDIUM) 200 MG Tab; Take 1 Tablet by mouth 3 times a day as needed for Moderate Pain for up to 2 days.  Dispense: 6 Tablet; Refill: 0      3. Oral contraceptive use  Discussed methods, compliance, and efficacy of birth control. Discussed medication desired effects, potential side effects, and how to administer medication. Educated on risk and signs of blood clot. Follow up in one year or sooner for signs of blood clot, heavy/uncontrollable bleeding, or concerning symptoms. Patient verbalized understanding and agreement regarding plan of care and all questions were answered.     - norgestrel-ethinyl estradiol (LO-OVRAL) 0.3-30 MG-MCG Tab; Take 1 Tablet by mouth every day for 84 days.  Dispense: 84 Each; Refill: 2    Medical Decision Making/Course:  In the course of preparing for this visit with review of the pertinent past medical history, recent and past clinic visits, current medications, and performing chart, immunization, medical history and medication reconciliation, and in the further course of obtaining the current history pertinent to the clinic visit today, performing an exam and evaluation, ordering and independently evaluating labs, tests, and/or procedures, prescribing any recommended new medications as noted above, providing any pertinent counseling and education and recommending further coordination of care. This was discussed with patient in a shared-decision making conversation, and they understand and agreed with plan of care.     Return if symptoms worsen or fail to improve.    Thank you, Christi KIM  [facility]

## 2022-10-22 LAB
BACTERIA UR CULT: ABNORMAL
SIGNIFICANT IND 70042: ABNORMAL
SITE SITE: ABNORMAL
SOURCE SOURCE: ABNORMAL

## 2023-04-20 ENCOUNTER — OFFICE VISIT (OUTPATIENT)
Dept: URGENT CARE | Facility: PHYSICIAN GROUP | Age: 28
End: 2023-04-20
Payer: COMMERCIAL

## 2023-04-20 VITALS
TEMPERATURE: 97.5 F | HEART RATE: 91 BPM | DIASTOLIC BLOOD PRESSURE: 72 MMHG | HEIGHT: 65 IN | SYSTOLIC BLOOD PRESSURE: 118 MMHG | RESPIRATION RATE: 16 BRPM | WEIGHT: 109 LBS | OXYGEN SATURATION: 98 % | BODY MASS INDEX: 18.16 KG/M2

## 2023-04-20 DIAGNOSIS — R10.32 LEFT LOWER QUADRANT ABDOMINAL PAIN: ICD-10-CM

## 2023-04-20 LAB
APPEARANCE UR: CLEAR
BILIRUB UR STRIP-MCNC: NORMAL MG/DL
COLOR UR AUTO: YELLOW
GLUCOSE UR STRIP.AUTO-MCNC: NORMAL MG/DL
KETONES UR STRIP.AUTO-MCNC: NORMAL MG/DL
LEUKOCYTE ESTERASE UR QL STRIP.AUTO: NORMAL
NITRITE UR QL STRIP.AUTO: NORMAL
PH UR STRIP.AUTO: 7 [PH] (ref 5–8)
POCT INT CON NEG: NEGATIVE
POCT INT CON POS: POSITIVE
POCT URINE PREGNANCY TEST: NEGATIVE
PROT UR QL STRIP: NORMAL MG/DL
RBC UR QL AUTO: NORMAL
SP GR UR STRIP.AUTO: 1.01
UROBILINOGEN UR STRIP-MCNC: 2 MG/DL

## 2023-04-20 PROCEDURE — 81002 URINALYSIS NONAUTO W/O SCOPE: CPT | Performed by: NURSE PRACTITIONER

## 2023-04-20 PROCEDURE — 99213 OFFICE O/P EST LOW 20 MIN: CPT | Performed by: NURSE PRACTITIONER

## 2023-04-20 PROCEDURE — 81025 URINE PREGNANCY TEST: CPT | Performed by: NURSE PRACTITIONER

## 2023-04-20 ASSESSMENT — ENCOUNTER SYMPTOMS
FEVER: 0
BACK PAIN: 1
ABDOMINAL PAIN: 1
FLANK PAIN: 1
VOMITING: 0
NAUSEA: 0

## 2023-04-20 NOTE — PROGRESS NOTES
Subjective:     Martha Joseph is a 27 y.o. female who presents for Dysuria (Poss Kidney inf. Pressure and burning. X2-3 months on and off.  Has been on antibiotic from OB/gyn. ) and Back Pain      Presents for abdominal pain. Believes she might have a kidney infection. States she had intense bladder pressure last night, with difficulty with urination; abdominal bloating and a more severe pain than current. Repots a dull ache in her left flank, ongoing for a couple of months. Saw PCP for it, 1-1.5 years and was tx for a UTI at the time. Denies vaginal discharge.  Normal bowel movements. Is not currently sexually active. OB/GYN had treated her with doxycycline and flagyl, 2-3 weeks ago. States she was diagnosed with BV, stating there were 2 different types of bacteria. Denies being treated for an STI. Was advised to f/u with GYN if those symptoms didn't resolve, which they did. Just finished her menses.        Dysuria   This is a new problem. Associated symptoms include flank pain. Pertinent negatives include no hematuria, nausea or vomiting.   Back Pain  Associated symptoms include abdominal pain and dysuria. Pertinent negatives include no fever.   Abdominal Pain  This is a new problem. The current episode started yesterday. Associated symptoms include dysuria. Pertinent negatives include no fever, hematuria, nausea or vomiting.     Past Medical History:   Diagnosis Date    Transverse myelitis (HCC)        Past Surgical History:   Procedure Laterality Date    TONSILLECTOMY         Social History     Socioeconomic History    Marital status: Single     Spouse name: Not on file    Number of children: Not on file    Years of education: Not on file    Highest education level: Not on file   Occupational History    Occupation:     Tobacco Use    Smoking status: Former     Packs/day: 0.25     Types: Cigarettes    Smokeless tobacco: Never    Tobacco comments:     7/2021   Vaping Use    Vaping Use: Every day  "   Substances: Nicotine    Devices: Disposable   Substance and Sexual Activity    Alcohol use: Not Currently    Drug use: Not Currently     Types: Marijuana     Comment: daily    Sexual activity: Not Currently     Partners: Male   Other Topics Concern    Not on file   Social History Narrative    Not on file     Social Determinants of Health     Financial Resource Strain: Not on file   Food Insecurity: Not on file   Transportation Needs: Not on file   Physical Activity: Not on file   Stress: Not on file   Social Connections: Not on file   Intimate Partner Violence: Not on file   Housing Stability: Not on file        No family history on file.     No Known Allergies    Review of Systems   Constitutional:  Negative for fever.   Gastrointestinal:  Positive for abdominal pain. Negative for nausea and vomiting.   Genitourinary:  Positive for dysuria and flank pain. Negative for hematuria.   Musculoskeletal:  Positive for back pain.   All other systems reviewed and are negative.     Objective:   /72   Pulse 91   Temp 36.4 °C (97.5 °F) (Temporal)   Resp 16   Ht 1.651 m (5' 5\")   Wt 49.4 kg (109 lb)   SpO2 98%   BMI 18.14 kg/m²     Physical Exam  Vitals reviewed.   Constitutional:       General: She is not in acute distress.     Appearance: She is not toxic-appearing.   Cardiovascular:      Rate and Rhythm: Normal rate.      Pulses: Normal pulses.   Pulmonary:      Effort: Pulmonary effort is normal. No respiratory distress.   Abdominal:      General: Bowel sounds are normal. There is no distension.      Palpations: Abdomen is soft.      Tenderness: There is abdominal tenderness in the left lower quadrant. There is guarding.      Comments: Patient verbalized \"ow\" with grimacing during palpation of LLQ abdomen. Bilateral flank tenderness to palpation reportedly greater on the right.    Skin:     General: Skin is warm and dry.   Neurological:      Mental Status: She is alert and oriented to person, place, and " time.       Assessment/Plan:   1. Left lower quadrant abdominal pain  - POCT Pregnancy  - POCT Urinalysis    Results for orders placed or performed in visit on 04/20/23   POCT Pregnancy   Result Value Ref Range    POC Urine Pregnancy Test Negative     Internal Control Positive Positive     Internal Control Negative Negative    POCT Urinalysis   Result Value Ref Range    POC Color yellow Negative    POC Appearance clear Negative    POC Glucose neg Negative mg/dL    POC Bilirubin neg Negative mg/dL    POC Ketones neg Negative mg/dL    POC Specific Gravity 1.010 <1.005 - >1.030    POC Blood neg Negative    POC Urine PH 7.0 5.0 - 8.0    POC Protein neg Negative mg/dL    POC Urobiligen 2.0 Negative (0.2) mg/dL    POC Nitrites neg Negative    POC Leukocyte Esterase neg Negative     Patient inquired abut outpatient imaging. Discussed ED follow up for further evaluation of abdominal pain, due to level of pain. Stable vitals. No indication of UTI or hematuria, Negative pregnancy. Discussed differential to include ovarian cyst.  Stable vitals.    Differential diagnosis, natural history, supportive care, and indications for immediate follow-up discussed.

## 2023-04-21 ENCOUNTER — HOSPITAL ENCOUNTER (OUTPATIENT)
Facility: MEDICAL CENTER | Age: 28
End: 2023-04-21
Attending: FAMILY MEDICINE
Payer: COMMERCIAL

## 2023-04-21 ENCOUNTER — HOSPITAL ENCOUNTER (OUTPATIENT)
Dept: LAB | Facility: MEDICAL CENTER | Age: 28
End: 2023-04-21
Attending: FAMILY MEDICINE
Payer: COMMERCIAL

## 2023-04-21 ENCOUNTER — OFFICE VISIT (OUTPATIENT)
Dept: MEDICAL GROUP | Facility: MEDICAL CENTER | Age: 28
End: 2023-04-21
Payer: COMMERCIAL

## 2023-04-21 VITALS
BODY MASS INDEX: 18.37 KG/M2 | OXYGEN SATURATION: 98 % | HEART RATE: 99 BPM | SYSTOLIC BLOOD PRESSURE: 98 MMHG | DIASTOLIC BLOOD PRESSURE: 60 MMHG | TEMPERATURE: 96.8 F | WEIGHT: 110.23 LBS | HEIGHT: 65 IN | RESPIRATION RATE: 16 BRPM

## 2023-04-21 DIAGNOSIS — R10.32 LLQ PAIN: ICD-10-CM

## 2023-04-21 DIAGNOSIS — N89.8 VAGINAL DISCHARGE: ICD-10-CM

## 2023-04-21 DIAGNOSIS — R35.0 URINARY FREQUENCY: ICD-10-CM

## 2023-04-21 LAB
BASOPHILS # BLD AUTO: 0.8 % (ref 0–1.8)
BASOPHILS # BLD: 0.08 K/UL (ref 0–0.12)
CRP SERPL HS-MCNC: 2 MG/DL (ref 0–0.75)
EOSINOPHIL # BLD AUTO: 0.56 K/UL (ref 0–0.51)
EOSINOPHIL NFR BLD: 5.9 % (ref 0–6.9)
ERYTHROCYTE [DISTWIDTH] IN BLOOD BY AUTOMATED COUNT: 40.3 FL (ref 35.9–50)
HCT VFR BLD AUTO: 40.8 % (ref 37–47)
HGB BLD-MCNC: 14.3 G/DL (ref 12–16)
IMM GRANULOCYTES # BLD AUTO: 0.04 K/UL (ref 0–0.11)
IMM GRANULOCYTES NFR BLD AUTO: 0.4 % (ref 0–0.9)
LYMPHOCYTES # BLD AUTO: 1.51 K/UL (ref 1–4.8)
LYMPHOCYTES NFR BLD: 16 % (ref 22–41)
MCH RBC QN AUTO: 30.4 PG (ref 27–33)
MCHC RBC AUTO-ENTMCNC: 35 G/DL (ref 33.6–35)
MCV RBC AUTO: 86.6 FL (ref 81.4–97.8)
MONOCYTES # BLD AUTO: 0.7 K/UL (ref 0–0.85)
MONOCYTES NFR BLD AUTO: 7.4 % (ref 0–13.4)
NEUTROPHILS # BLD AUTO: 6.53 K/UL (ref 2–7.15)
NEUTROPHILS NFR BLD: 69.5 % (ref 44–72)
NRBC # BLD AUTO: 0 K/UL
NRBC BLD-RTO: 0 /100 WBC
PLATELET # BLD AUTO: 218 K/UL (ref 164–446)
PMV BLD AUTO: 11.5 FL (ref 9–12.9)
POCT INT CON NEG: NEGATIVE
POCT INT CON POS: POSITIVE
POCT URINE PREGNANCY TEST: NEGATIVE
RBC # BLD AUTO: 4.71 M/UL (ref 4.2–5.4)
WBC # BLD AUTO: 9.4 K/UL (ref 4.8–10.8)

## 2023-04-21 PROCEDURE — 85025 COMPLETE CBC W/AUTO DIFF WBC: CPT

## 2023-04-21 PROCEDURE — 99214 OFFICE O/P EST MOD 30 MIN: CPT | Performed by: FAMILY MEDICINE

## 2023-04-21 PROCEDURE — 87086 URINE CULTURE/COLONY COUNT: CPT

## 2023-04-21 PROCEDURE — 86140 C-REACTIVE PROTEIN: CPT

## 2023-04-21 PROCEDURE — 36415 COLL VENOUS BLD VENIPUNCTURE: CPT

## 2023-04-21 PROCEDURE — 88175 CYTOPATH C/V AUTO FLUID REDO: CPT

## 2023-04-21 PROCEDURE — 87660 TRICHOMONAS VAGIN DIR PROBE: CPT

## 2023-04-21 PROCEDURE — 87491 CHLMYD TRACH DNA AMP PROBE: CPT

## 2023-04-21 PROCEDURE — 85652 RBC SED RATE AUTOMATED: CPT

## 2023-04-21 PROCEDURE — 87591 N.GONORRHOEAE DNA AMP PROB: CPT

## 2023-04-21 PROCEDURE — 87480 CANDIDA DNA DIR PROBE: CPT

## 2023-04-21 PROCEDURE — 87510 GARDNER VAG DNA DIR PROBE: CPT

## 2023-04-21 PROCEDURE — 81025 URINE PREGNANCY TEST: CPT | Performed by: FAMILY MEDICINE

## 2023-04-21 RX ORDER — DOXYCYCLINE HYCLATE 100 MG
100 TABLET ORAL 2 TIMES DAILY
Qty: 14 TABLET | Refills: 0 | Status: SHIPPED | OUTPATIENT
Start: 2023-04-21 | End: 2023-04-28

## 2023-04-21 NOTE — PROGRESS NOTES
This medical record contains text that has been entered with the assistance of computer voice recognition and dictation software.  Therefore, it may contain unintended errors in text, spelling, punctuation, or grammar        Chief Complaint   Patient presents with    Abdominal Pain     Starting on Wednesday she was having hard time urinating, mostly on the left lower abdominal quadrant. Sometimes dizzy and nausea. Has been having the pain for a few months but Wednesday was the first day with difficulty urinating       Martha Joseph is a 27 y.o. female here evaluation and management of:           Current Outpatient Medications   Medication Sig Dispense Refill    doxycycline (VIBRAMYCIN) 100 MG Tab Take 1 Tablet by mouth 2 times a day for 7 days. 14 Tablet 0    promethazine-dextromethorphan (PROMETHAZINE-DM) 6.25-15 MG/5ML syrup Take 5 mL by mouth every 6 hours as needed for Cough. (Patient not taking: Reported on 4/20/2023) 120 mL 0    ibuprofen (MOTRIN) 600 MG Tab Take 1 Tablet by mouth every 8 hours as needed for Mild Pain, Fever or Headache. (Patient not taking: Reported on 10/19/2022) 15 Tablet 1     No current facility-administered medications for this visit.     Patient Active Problem List    Diagnosis Date Noted    LLQ pain 04/21/2023    Vaginal discharge 04/21/2023    Acute transverse myelitis (HCC) 11/16/2009     Past Surgical History:   Procedure Laterality Date    TONSILLECTOMY        Social History     Tobacco Use    Smoking status: Former     Packs/day: 0.25     Types: Cigarettes    Smokeless tobacco: Never    Tobacco comments:     7/2021   Vaping Use    Vaping Use: Every day    Substances: Nicotine    Devices: Disposable   Substance Use Topics    Alcohol use: Yes     Comment: once or twice a week    Drug use: Not Currently     Types: Marijuana     No family history on file.        ROS    all review of system completed and negative except for those listed above     Objective:     BP 98/60 (BP Location:  "Left arm, Patient Position: Sitting, BP Cuff Size: Adult long)   Pulse 99   Temp 36 °C (96.8 °F) (Temporal)   Resp 16   Ht 1.651 m (5' 5\")   Wt 50 kg (110 lb 3.7 oz)   SpO2 98%  Body mass index is 18.34 kg/m².  Physical Exam:    Constitutional: Alert, no distress.  Skin: Warm, dry, good turgor, no rashes in visible areas.  Eye: Equal, round and reactive, conjunctiva clear, lids normal.  ENMT: Lips without lesions, good dentition, oropharynx clear.  Neck: Trachea midline, no masses, no thyromegaly. No cervical or supraclavicular lymphadenopathy.  Respiratory: Unlabored respiratory effort, lungs clear to auscultation, no wheezes, no ronchi.  Cardiovascular: Normal S1, S2, no murmur, no edema.  Abdomen: Soft, non-tender, no masses, no hepatosplenomegaly.  Psych: Alert and oriented x3, normal affect and mood.  See below for gyn        Assessment and Plan:   The following treatment plan was discussed        Problem List Items Addressed This Visit       LLQ pain     LLQ pain in female persistent x 2 mo   Went to UC 2 days ago but reports no imaging was done and we have no records to review   She is also reporting dysuria/urinary frequency and she is also reporting abnormal vaginal discharge   No fever  No dyspareunia  No blood in urine       Exam normal   No \"chandellier sign\" /cervical motion tenderness  Cervix and vaginal ben normal   Some LLQ pain      u preg neg   gcct   Urine culture   Pap  labs  Pelvic US   And short term follow up   Strict ER precautions   empric abx     Any critical lab findings resulted over the weekend should go to on call (such as abnormal STI testing),  and she is now going to be on emperic abx   As far as vaginosis (she states she has chronic BV, BV does not cause symptoms that she is experiencing so we do not need to treat urgently if this is + on test, same with yeast not urgent but we can treat at follow up appt)     If her symptoms persist or worsen we should consider renal stone " which would require ER evaluation vs CT scan        30+ min spent            Relevant Medications    doxycycline (VIBRAMYCIN) 100 MG Tab    Other Relevant Orders    THINPREP PAP, REFLEX HPV ON ASC-US AND ABOVE    Chlamydia/GC PCR Assoc.W/Thinprep    US-PELVIC TRANSVAGINAL ONLY    VAGINAL PATHOGENS DNA PANEL    CBC WITH DIFFERENTIAL    Sed Rate    CRP QUANTITIVE (NON-CARDIAC)    Vaginal discharge    Relevant Medications    doxycycline (VIBRAMYCIN) 100 MG Tab    Other Relevant Orders    THINPREP PAP, REFLEX HPV ON ASC-US AND ABOVE    Chlamydia/GC PCR Assoc.W/Thinprep    US-PELVIC TRANSVAGINAL ONLY    VAGINAL PATHOGENS DNA PANEL     Other Visit Diagnoses       Urinary frequency        Relevant Orders    THINPREP PAP, REFLEX HPV ON ASC-US AND ABOVE    Chlamydia/GC PCR Assoc.W/Thinprep    US-PELVIC TRANSVAGINAL ONLY    VAGINAL PATHOGENS DNA PANEL    URINE CULTURE(NEW)                  Instructed to follow up if symptoms worsen or fail to improve, ER/UC precautions discussed as well    Johnna Becker MD  [facility], Family Medicine   [address] [place] [address] [address]   [place] [id number]  Phone: [phone]

## 2023-04-21 NOTE — ASSESSMENT & PLAN NOTE
"LLQ pain in female persistent x 2 mo   Went to UC 2 days ago but reports no imaging was done and we have no records to review   She is also reporting dysuria/urinary frequency and she is also reporting abnormal vaginal discharge   No fever  No dyspareunia  No blood in urine       Exam normal   No \"chandellier sign\" /cervical motion tenderness  Cervix and vaginal ben normal   Some LLQ pain      u preg neg   gcct   Urine culture   Pap  labs  Pelvic US   And short term follow up   Strict ER precautions   empric abx     Any critical lab findings resulted over the weekend should go to on call (such as abnormal STI testing),  and she is now going to be on emperic abx   As far as vaginosis (she states she has chronic BV, BV does not cause symptoms that she is experiencing so we do not need to treat urgently if this is + on test, same with yeast not urgent but we can treat at follow up appt)     If her symptoms persist or worsen we should consider renal stone which would require ER evaluation vs CT scan        30+ min spent     "

## 2023-04-22 DIAGNOSIS — N89.8 VAGINAL DISCHARGE: ICD-10-CM

## 2023-04-22 DIAGNOSIS — R10.32 LLQ PAIN: ICD-10-CM

## 2023-04-22 DIAGNOSIS — R35.0 URINARY FREQUENCY: ICD-10-CM

## 2023-04-22 LAB
CANDIDA DNA VAG QL PROBE+SIG AMP: NEGATIVE
CYTOLOGY REG CYTOL: NORMAL
ERYTHROCYTE [SEDIMENTATION RATE] IN BLOOD BY WESTERGREN METHOD: 5 MM/HOUR (ref 0–25)
G VAGINALIS DNA VAG QL PROBE+SIG AMP: NEGATIVE
T VAGINALIS DNA VAG QL PROBE+SIG AMP: NEGATIVE

## 2023-04-24 ENCOUNTER — TELEPHONE (OUTPATIENT)
Dept: MEDICAL GROUP | Facility: MEDICAL CENTER | Age: 28
End: 2023-04-24
Payer: COMMERCIAL

## 2023-04-24 LAB
BACTERIA UR CULT: NORMAL
C TRACH DNA GENITAL QL NAA+PROBE: NEGATIVE
N GONORRHOEA DNA GENITAL QL NAA+PROBE: NEGATIVE
SIGNIFICANT IND 70042: NORMAL
SITE SITE: NORMAL
SOURCE SOURCE: NORMAL
SPECIMEN SOURCE: NORMAL

## 2023-04-24 NOTE — TELEPHONE ENCOUNTER
1. Caller Name: Martha Joseph                        Call Back Number: [phone] (home)       How would the patient prefer to be contacted with a response: Phone call OK to leave a detailed message    Pt called and left a vm stating that she was seen by Dr. Becker on 4/21/23 and was given an urgent Pelvic US. The soonest appointment imaging has is 5/15/23, is it ok for pt to wait that long ?  Please advise, thanks.

## 2023-05-11 ENCOUNTER — OFFICE VISIT (OUTPATIENT)
Dept: MEDICAL GROUP | Facility: MEDICAL CENTER | Age: 28
End: 2023-05-11
Payer: COMMERCIAL

## 2023-05-11 VITALS
BODY MASS INDEX: 18.46 KG/M2 | TEMPERATURE: 98.2 F | WEIGHT: 110.78 LBS | SYSTOLIC BLOOD PRESSURE: 102 MMHG | OXYGEN SATURATION: 99 % | RESPIRATION RATE: 18 BRPM | HEART RATE: 68 BPM | HEIGHT: 65 IN | DIASTOLIC BLOOD PRESSURE: 58 MMHG

## 2023-05-11 DIAGNOSIS — R10.32 LLQ PAIN: ICD-10-CM

## 2023-05-11 PROCEDURE — 3078F DIAST BP <80 MM HG: CPT | Performed by: FAMILY MEDICINE

## 2023-05-11 PROCEDURE — 99214 OFFICE O/P EST MOD 30 MIN: CPT | Performed by: FAMILY MEDICINE

## 2023-05-11 PROCEDURE — 1125F AMNT PAIN NOTED PAIN PRSNT: CPT | Performed by: FAMILY MEDICINE

## 2023-05-11 PROCEDURE — 3074F SYST BP LT 130 MM HG: CPT | Performed by: FAMILY MEDICINE

## 2023-05-11 NOTE — PROGRESS NOTES
"This medical record contains text that has been entered with the assistance of computer voice recognition and dictation software.  Therefore, it may contain unintended errors in text, spelling, punctuation, or grammar        Chief Complaint   Patient presents with    LUQ Pain     Improved but still there    Vaginal Discharge     Improved    Urinary Frequency     improved       Martha Joseph is a 28 y.o. female here evaluation and management of:    Current Outpatient Medications   Medication Sig Dispense Refill    promethazine-dextromethorphan (PROMETHAZINE-DM) 6.25-15 MG/5ML syrup Take 5 mL by mouth every 6 hours as needed for Cough. (Patient not taking: Reported on 4/20/2023) 120 mL 0    ibuprofen (MOTRIN) 600 MG Tab Take 1 Tablet by mouth every 8 hours as needed for Mild Pain, Fever or Headache. (Patient not taking: Reported on 10/19/2022) 15 Tablet 1     No current facility-administered medications for this visit.     Patient Active Problem List    Diagnosis Date Noted    LLQ pain 04/21/2023    Vaginal discharge 04/21/2023    Acute transverse myelitis (HCC) 11/16/2009     Past Surgical History:   Procedure Laterality Date    TONSILLECTOMY        Social History     Tobacco Use    Smoking status: Former     Packs/day: 0.25     Types: Cigarettes    Smokeless tobacco: Never    Tobacco comments:     7/2021   Vaping Use    Vaping Use: Every day    Substances: Nicotine    Devices: Disposable   Substance Use Topics    Alcohol use: Yes     Comment: once or twice a week    Drug use: Not Currently     Types: Marijuana     No family history on file.        ROS    all review of system completed and negative except for those listed above     Objective:     /58 (BP Location: Left arm, Patient Position: Sitting, BP Cuff Size: Adult long)   Pulse 68   Temp 36.8 °C (98.2 °F) (Temporal)   Resp 18   Ht 1.651 m (5' 5\")   Wt 50.3 kg (110 lb 12.5 oz)   SpO2 99%  Body mass index is 18.43 kg/m².  Physical Exam:        GEN: " comfortable, alert and oriented, well nourished, well developed, in no apparent distress   HEENT: NCAT, eyes: pupils equal and reactive, sclera white, EOMIT, good dentition  HEART: limbs warm and well perfused, regular rate, no JVD, no lower extremity edema  LUNGS: speaking in full sentences, not in apparent respiratory distress, no audible wheezes  MSK: normal tone and bulk, no swelling of the joints, gait steady and normal           Assessment and Plan:   The following treatment plan was discussed        Problem List Items Addressed This Visit       LLQ pain     Feeling better but not 100%   All of infectious studies wnl   US will be in 4 days and I encourage her to follow through with this     I explain that many causes of abdominal/pelvic pain are non diagnosable but thankfully self limiting     Contraception options discussed in detail, she will see her OB/GYn for IUD   30+ min spent                       Instructed to follow up if symptoms worsen or fail to improve, ER/UC precautions discussed as well    Johnna eBcker MD  [facility]   [address] [place] [address] [address]   [place] [id number]  Phone: [phone]

## 2023-05-11 NOTE — ASSESSMENT & PLAN NOTE
Feeling better but not 100%   All of infectious studies wnl   US will be in 4 days and I encourage her to follow through with this     I explain that many causes of abdominal/pelvic pain are non diagnosable but thankfully self limiting     Contraception options discussed in detail, she will see her OB/GYn for IUD   30+ min spent

## 2025-02-28 ENCOUNTER — OFFICE VISIT (OUTPATIENT)
Dept: URGENT CARE | Facility: PHYSICIAN GROUP | Age: 30
End: 2025-02-28

## 2025-02-28 VITALS
RESPIRATION RATE: 20 BRPM | WEIGHT: 115.8 LBS | HEART RATE: 69 BPM | OXYGEN SATURATION: 98 % | DIASTOLIC BLOOD PRESSURE: 60 MMHG | SYSTOLIC BLOOD PRESSURE: 118 MMHG | TEMPERATURE: 97.9 F | HEIGHT: 65 IN | BODY MASS INDEX: 19.29 KG/M2

## 2025-02-28 DIAGNOSIS — J02.8 PHARYNGITIS DUE TO OTHER ORGANISM: ICD-10-CM

## 2025-02-28 RX ORDER — AZITHROMYCIN 250 MG/1
TABLET, FILM COATED ORAL
Qty: 6 TABLET | Refills: 0 | Status: SHIPPED | OUTPATIENT
Start: 2025-02-28

## 2025-02-28 ASSESSMENT — ENCOUNTER SYMPTOMS
SORE THROAT: 1
HEADACHES: 1

## 2025-02-28 NOTE — PROGRESS NOTES
"Subjective     Martha Joseph is a 29 y.o. female who presents with Ear Pain (Bilateral x 1 day ), Pharyngitis (X 1 week ), and Headache (X 1 week )    This is a  new problem with uncertain prognosis:   This is a very pleasant 29 y.o. who has come to the walk-in clinic today for 1 week with sore throat and then last night ears have been having little bit of pain in them.  No new cough or nasal congestion no fevers or chills          ALLERGIES:  Patient has no known allergies.     PMH:  Past Medical History:   Diagnosis Date    Transverse myelitis (HCC)         PSH:  Past Surgical History:   Procedure Laterality Date    TONSILLECTOMY         MEDS:    Current Outpatient Medications:     azithromycin (ZITHROMAX) 250 MG Tab, Use as directed, Disp: 6 Tablet, Rfl: 0    ** I have documented what I find to be significant in regards to past medical, social, family and surgical history  in my HPI or under PMH/PSH/FH review section, otherwise it is noncontributory **           HPI    Review of Systems   HENT:  Positive for ear pain and sore throat.    Neurological:  Positive for headaches.   All other systems reviewed and are negative.             Objective     /60   Pulse 69   Temp 36.6 °C (97.9 °F) (Temporal)   Resp 20   Ht 1.651 m (5' 5\")   Wt 52.5 kg (115 lb 12.8 oz)   SpO2 98%   BMI 19.27 kg/m²      Physical Exam  Vitals and nursing note reviewed.   Constitutional:       General: She is not in acute distress.     Appearance: Normal appearance. She is well-developed. She is not ill-appearing, toxic-appearing or diaphoretic.   HENT:      Head: Normocephalic.      Right Ear: Tympanic membrane normal.      Left Ear: Tympanic membrane normal.      Mouth/Throat:      Pharynx: Posterior oropharyngeal erythema present. No oropharyngeal exudate.   Cardiovascular:      Rate and Rhythm: Normal rate and regular rhythm.      Heart sounds: Normal heart sounds.   Pulmonary:      Effort: Pulmonary effort is normal. No " respiratory distress.   Musculoskeletal:      Cervical back: Tenderness present.   Lymphadenopathy:      Cervical: Cervical adenopathy present.   Neurological:      Mental Status: She is alert.      Motor: No abnormal muscle tone.   Psychiatric:         Mood and Affect: Mood normal.         Behavior: Behavior normal.         1. Pharyngitis due to other organism  azithromycin (ZITHROMAX) 250 MG Tab          - Dx, plan & d/c instructions discussed   - Rest, stay hydrated, OTC Aleve-D prn x 5 days      Follow up with your regular primary care providers office within a week to keep them updated and informed of this visit and for regular routine health maintenance check-ups. ER if not improving in 2-3 days or if feeling/getting worse. (If you do not have a primary care provider and need to schedule one you may call [facility] at [phone] to do this).    Patient left in stable condition               Discussed if any in-clinic testing done they should check [facility] later today for results and instructions.  Testing such as strep, covid, flu, RSV and x-rays    Discussed if any testing, labs or imaging studies are obtained outside of the [facility] facility, it is their responsibility to contact the Urgent Care and let us know that it was done and get us the results so adequate follow up can be initiated    Any pertinent prior lab work and/or imaging studies in Epic have been reviewed by me today on day of this visit and taken into account for my treatment and plan today    Any pertinent PMH/PSH and/or chronic conditions and medications if any were reviewed today and taken into account for my treatment and plan today    Pertinent prior office visit, ER and urgent care notes in [facility] have been reviewed by me today on day of this visit.    Please note that this dictation may have been created using voice recognition software, if so I have made every reasonable attempt to correct obvious errors, but I expect that there are errors  of grammar and possibly content that I did not discover before finalizing the note.